# Patient Record
Sex: FEMALE | Race: OTHER | NOT HISPANIC OR LATINO | Employment: OTHER | URBAN - METROPOLITAN AREA
[De-identification: names, ages, dates, MRNs, and addresses within clinical notes are randomized per-mention and may not be internally consistent; named-entity substitution may affect disease eponyms.]

---

## 2024-04-30 ENCOUNTER — DOCUMENTATION (OUTPATIENT)
Dept: HEMATOLOGY ONCOLOGY | Facility: CLINIC | Age: 54
End: 2024-04-30

## 2024-04-30 ENCOUNTER — OFFICE VISIT (OUTPATIENT)
Dept: FAMILY MEDICINE CLINIC | Facility: CLINIC | Age: 54
End: 2024-04-30
Payer: COMMERCIAL

## 2024-04-30 ENCOUNTER — TELEPHONE (OUTPATIENT)
Dept: HEMATOLOGY ONCOLOGY | Facility: CLINIC | Age: 54
End: 2024-04-30

## 2024-04-30 VITALS
BODY MASS INDEX: 18.8 KG/M2 | DIASTOLIC BLOOD PRESSURE: 62 MMHG | TEMPERATURE: 97.7 F | WEIGHT: 138.8 LBS | RESPIRATION RATE: 16 BRPM | OXYGEN SATURATION: 98 % | HEIGHT: 72 IN | SYSTOLIC BLOOD PRESSURE: 88 MMHG | HEART RATE: 68 BPM

## 2024-04-30 DIAGNOSIS — Z15.01 BRCA1 GENE MUTATION POSITIVE: ICD-10-CM

## 2024-04-30 DIAGNOSIS — Z98.890 HX OF BREAST RECONSTRUCTION: ICD-10-CM

## 2024-04-30 DIAGNOSIS — L03.112 CELLULITIS OF LEFT AXILLA: ICD-10-CM

## 2024-04-30 DIAGNOSIS — F32.A ANXIETY AND DEPRESSION: Primary | ICD-10-CM

## 2024-04-30 DIAGNOSIS — F41.9 ANXIETY AND DEPRESSION: Primary | ICD-10-CM

## 2024-04-30 DIAGNOSIS — Z15.09 BRCA1 GENE MUTATION POSITIVE: ICD-10-CM

## 2024-04-30 DIAGNOSIS — N95.1 MENOPAUSAL SYMPTOMS: ICD-10-CM

## 2024-04-30 PROBLEM — F51.01 PRIMARY INSOMNIA: Status: ACTIVE | Noted: 2024-04-30

## 2024-04-30 PROBLEM — L03.90 CELLULITIS: Status: ACTIVE | Noted: 2024-04-30

## 2024-04-30 PROCEDURE — 99214 OFFICE O/P EST MOD 30 MIN: CPT | Performed by: FAMILY MEDICINE

## 2024-04-30 PROCEDURE — 3725F SCREEN DEPRESSION PERFORMED: CPT | Performed by: FAMILY MEDICINE

## 2024-04-30 RX ORDER — AMOXICILLIN AND CLAVULANATE POTASSIUM 500; 125 MG/1; MG/1
1 TABLET, FILM COATED ORAL EVERY 12 HOURS
COMMUNITY
Start: 2024-04-27 | End: 2024-05-08 | Stop reason: ALTCHOICE

## 2024-04-30 RX ORDER — ALPRAZOLAM 0.5 MG/1
0.5 TABLET ORAL
COMMUNITY

## 2024-04-30 RX ORDER — FLUOXETINE HYDROCHLORIDE 20 MG/1
20 CAPSULE ORAL DAILY
COMMUNITY

## 2024-04-30 RX ORDER — DOXYCYCLINE HYCLATE 100 MG/1
100 CAPSULE ORAL EVERY 12 HOURS
COMMUNITY
Start: 2024-04-27 | End: 2024-05-08 | Stop reason: ALTCHOICE

## 2024-04-30 NOTE — ASSESSMENT & PLAN NOTE
- Patient establishing care today.  Recently moved from [place].  -Notes she is following with Psychiatry in [place]  - Using Prozac 20 mg for many years.  -Anxiety and depression, stable.  Denies any SI or HI.  -Also uses Delorazepam as needed at bedtime for racing thoughts and increased anxiety which also seems to help.  -Follow-up in 6 months or sooner as needed.

## 2024-04-30 NOTE — PROGRESS NOTES
Intake received- chart reviewed for external records retrieval.Patient is scheduled on 5-7-2024 with Kristin Walker for Dx: BRCA 1 mutation positive, Cellulitis of left axilla .Due to Dx on patient's referral, no records retrieval needed by Oncology Care Coordination.

## 2024-04-30 NOTE — TELEPHONE ENCOUNTER
Anne called in response to a referral that was received for patient to establish care with Surgical Oncology.     Outreach was made to schedule a consultation.    A consultation was scheduled for patient during this call. Patient is scheduled on 5/7/24 at 2:30 with Kristin Walker at the [facility]

## 2024-04-30 NOTE — PROGRESS NOTES
Name: Anne Calderón      : 1970      MRN: [id number]  Encounter Provider: Adam Khan DO  Encounter Date: 2024   Encounter department: [facility]    Assessment & Plan     1. Anxiety and depression  Assessment & Plan:  - Patient establishing care today.  Recently moved from [place].  -Notes she is following with Psychiatry in [place]  - Using Prozac 20 mg for many years.  -Anxiety and depression, stable.  Denies any SI or HI.  -Also uses Delorazepam as needed at bedtime for racing thoughts and increased anxiety which also seems to help.  -Follow-up in 6 months or sooner as needed.      2. Cellulitis of left axilla  Assessment & Plan:  - Has concern for area of erythema on underside of left breast and axilla.  Seen in urgent care and started on antibiotics for possible cellulitis.  -Area of erythema does not seem to be increasing.  However after a few days of antibiotics unclear if much improved.  -Denies fevers, chills, nausea, vomiting.  Denies any unwanted weight loss or night sweats.  Denies any trauma to the area.  -Will refer to breast surgeon for evaluation.  -Continue antibiotics to completion.    Orders:  -     Ambulatory Referral to Breast Surgery; Future  -     mupirocin (BACTROBAN) 2 % ointment; Apply topically 3 (three) times a day    3. BRCA1 gene mutation positive  Assessment & Plan:  - Tested for procal and a mutation few years ago in [place] and found positive.  -Had prophylactic bilateral mastectomy and reconstructive surgery.      Orders:  -     Ambulatory Referral to Breast Surgery; Future    4. Menopausal symptoms  Assessment & Plan:  - Establishing care today. Has been using Tibolone prescribed in [place] for hot flashes, vaginal dryness and feels it is the only medication that has worked for her.   -Referral placed for recommendations.    Orders:  -     Ambulatory Referral to Obstetrics / Gynecology; Future    5. Hx of breast reconstruction  -     Ambulatory  Referral to Breast Surgery; Future           Subjective     Anne is a 54-year-old female who presents today to establish care.  She notes she recently moved from [place].  Admits to a known colonic however 3 to 4 years due to her 's work.  She presents today with concerns about diagnosed cellulitis under her left breast.  Patient has significant past medical history of BRCA1 mutation for which she had prophylactic bilateral mastectomy and reconstructive surgery.  This was done in [place].  She has relationship with her breast surgeon and was seeking advice over text message however they recommended her being seen.  Was seen in urgent care and started on antibiotics for possible infectious etiology.  Patient has been taking antibiotics for couple days with no worsening of erythema however unclear if it is improved much.  She denies any fevers, chills, nausea, vomiting, streaking of the redness.  Denies any discharge from the area.  Is mildly tender.  Denies any trauma.  She also notes has a past medical history of anxiety and depression which she was following with psychiatrist.  She is taking Prozac and has been on it for quite some time and feels well-controlled.  Denies any SI or HI here today.  Also notes she has trouble with sleeping due to her anxiety medication and has been prescribed a benzodiazepine-delorazepam she is on occasion and is well-controlled.  Is getting at least 7 to 8 hours of sleep at night.  She is otherwise eating healthy and exercising most days.  No other concerns today.      Review of Systems   Constitutional:  Negative for chills and fever.   HENT:  Negative for congestion, ear pain, postnasal drip, rhinorrhea, sinus pressure and sore throat.    Eyes:  Negative for pain and visual disturbance.   Respiratory:  Negative for cough and shortness of breath.    Cardiovascular:  Negative for chest pain and palpitations.   Gastrointestinal:  Negative for abdominal pain and vomiting.    Endocrine: Negative for polydipsia and polyuria.   Genitourinary:  Negative for dysuria and hematuria.   Musculoskeletal:  Negative for arthralgias and back pain.   Skin:  Positive for color change. Negative for rash.        Area of redness under left breast.   Neurological:  Negative for dizziness, seizures, syncope and headaches.   Psychiatric/Behavioral:  Negative for confusion, sleep disturbance and suicidal ideas. The patient is nervous/anxious.    All other systems reviewed and are negative.      Past Medical History:   Diagnosis Date    Anxiety     Arthritis     Pneumonia     Visual impairment      Past Surgical History:   Procedure Laterality Date    APPENDECTOMY      KNEE SURGERY       Family History   Problem Relation Age of Onset    Breast cancer Mother     Cancer Father     Arthritis Sister     Breast cancer Sister     Arthritis Brother      Social History     Socioeconomic History    Marital status: /Civil Union     Spouse name: None    Number of children: None    Years of education: None    Highest education level: None   Occupational History    None   Tobacco Use    Smoking status: Never    Smokeless tobacco: Never    Tobacco comments:     Sometimes Sigars or Pipe but very seldom   Vaping Use    Vaping status: Never Used   Substance and Sexual Activity    Alcohol use: Never    Drug use: Never    Sexual activity: Yes     Partners: Male     Birth control/protection: Post-menopausal     Comment: Preventive Ovarectomy   Other Topics Concern    None   Social History Narrative    None     Social Determinants of Health     Financial Resource Strain: Not on file   Food Insecurity: Not on file   Transportation Needs: Not on file   Physical Activity: Not on file   Stress: Not on file   Social Connections: Not on file   Intimate Partner Violence: Not on file   Housing Stability: Not on file     Current Outpatient Medications on File Prior to Visit   Medication Sig    ALPRAZolam (XANAX) 0.5 mg tablet  "Take 0.5 mg by mouth daily at bedtime as needed for anxiety    amoxicillin-clavulanate (AUGMENTIN) 500-125 mg per tablet Take 1 tablet by mouth every 12 (twelve) hours    doxycycline hyclate (VIBRAMYCIN) 100 mg capsule Take 100 mg by mouth every 12 (twelve) hours    FLUoxetine (PROzac) 20 mg capsule Take 20 mg by mouth daily     No Known Allergies    There is no immunization history on file for this patient.    Objective     BP (!) 88/62 (BP Location: Left arm, Patient Position: Sitting, Cuff Size: Standard)   Pulse 68   Temp 97.7 °F (36.5 °C) (Tympanic)   Resp 16   Ht 5' 11.61\" (1.819 m)   Wt 63 kg (138 lb 12.8 oz)   SpO2 98%   BMI 19.03 kg/m²     Physical Exam  Vitals and nursing note reviewed.   Constitutional:       Appearance: Normal appearance.   HENT:      Head: Normocephalic and atraumatic.      Right Ear: Tympanic membrane and external ear normal.      Left Ear: Tympanic membrane and external ear normal.      Nose: Nose normal.      Mouth/Throat:      Mouth: Mucous membranes are moist.   Eyes:      Extraocular Movements: Extraocular movements intact.      Conjunctiva/sclera: Conjunctivae normal.      Pupils: Pupils are equal, round, and reactive to light.   Cardiovascular:      Rate and Rhythm: Normal rate and regular rhythm.      Pulses: Normal pulses.      Heart sounds: Normal heart sounds.   Pulmonary:      Effort: Pulmonary effort is normal.      Breath sounds: Normal breath sounds.   Abdominal:      General: Bowel sounds are normal.      Palpations: Abdomen is soft.   Musculoskeletal:         General: Normal range of motion.      Cervical back: Normal range of motion.   Lymphadenopathy:      Cervical: No cervical adenopathy.   Skin:     General: Skin is warm.      Capillary Refill: Capillary refill takes less than 2 seconds.      Findings: Erythema present.      Comments: 2 to 3 cm area of erythema, no fluctuance noted.  Mildly tender to palpation and warm.  No streaking.  No noted axillary " lymphadenopathy.   Neurological:      General: No focal deficit present.      Mental Status: She is alert and oriented to person, place, and time.   Psychiatric:         Mood and Affect: Mood normal.         Behavior: Behavior normal.       Adam Khan, DO

## 2024-04-30 NOTE — ASSESSMENT & PLAN NOTE
- Tested for procal and a mutation few years ago in [place] and found positive.  -Had prophylactic bilateral mastectomy and reconstructive surgery.

## 2024-05-02 ENCOUNTER — TELEPHONE (OUTPATIENT)
Dept: SURGICAL ONCOLOGY | Facility: CLINIC | Age: 54
End: 2024-05-02

## 2024-05-02 PROBLEM — N95.1 MENOPAUSAL SYMPTOMS: Status: ACTIVE | Noted: 2024-05-02

## 2024-05-02 NOTE — ASSESSMENT & PLAN NOTE
- Establishing care today. Has been using Tibolone prescribed in [place] for hot flashes, vaginal dryness and feels it is the only medication that has worked for her.   -Referral placed for recommendations.

## 2024-05-02 NOTE — ASSESSMENT & PLAN NOTE
- Has concern for area of erythema on underside of left breast and axilla.  Seen in urgent care and started on antibiotics for possible cellulitis.  -Area of erythema does not seem to be increasing.  However after a few days of antibiotics unclear if much improved.  -Denies fevers, chills, nausea, vomiting.  Denies any unwanted weight loss or night sweats.  Denies any trauma to the area.  -Will refer to breast surgeon for evaluation.  -Continue antibiotics to completion.

## 2024-05-02 NOTE — TELEPHONE ENCOUNTER
Called patient 4/30/24 and left voice message. Called and spoke to patient 5/2/24. Patient was seen at [facility] in [place] and had mastectomy done 2006. Patient had no mammograms done. Faxed request, having records requested to Right Fax [phone].

## 2024-05-07 ENCOUNTER — CONSULT (OUTPATIENT)
Dept: SURGICAL ONCOLOGY | Facility: CLINIC | Age: 54
End: 2024-05-07

## 2024-05-07 ENCOUNTER — APPOINTMENT (OUTPATIENT)
Dept: LAB | Facility: CLINIC | Age: 54
End: 2024-05-07

## 2024-05-07 VITALS
HEART RATE: 52 BPM | HEIGHT: 72 IN | WEIGHT: 141.2 LBS | BODY MASS INDEX: 19.13 KG/M2 | TEMPERATURE: 99.1 F | DIASTOLIC BLOOD PRESSURE: 60 MMHG | SYSTOLIC BLOOD PRESSURE: 120 MMHG | OXYGEN SATURATION: 100 %

## 2024-05-07 DIAGNOSIS — N61.1 LEFT BREAST ABSCESS: ICD-10-CM

## 2024-05-07 DIAGNOSIS — N61.1 LEFT BREAST ABSCESS: Primary | ICD-10-CM

## 2024-05-07 PROBLEM — Z90.13 S/P MASTECTOMY, BILATERAL: Status: ACTIVE | Noted: 2024-05-07

## 2024-05-07 LAB
BUN SERPL-MCNC: 15 MG/DL (ref 5–25)
CREAT SERPL-MCNC: 0.79 MG/DL (ref 0.6–1.3)
GFR SERPL CREATININE-BSD FRML MDRD: 85 ML/MIN/1.73SQ M

## 2024-05-07 PROCEDURE — 82565 ASSAY OF CREATININE: CPT

## 2024-05-07 PROCEDURE — 36415 COLL VENOUS BLD VENIPUNCTURE: CPT

## 2024-05-07 PROCEDURE — 84520 ASSAY OF UREA NITROGEN: CPT

## 2024-05-07 PROCEDURE — 99204 OFFICE O/P NEW MOD 45 MIN: CPT

## 2024-05-07 NOTE — PROGRESS NOTES
Surgical Oncology Consult       [facility] [place]  [address] [facility] [address]  [phone]    Anne Calderón  1970  [id number]  [facility] [place]  [address] [facility] [address]  [phone]    Diagnoses and all orders for this visit:    Left breast abscess  -     MRI breast bilateral w and wo contrast w cad; Future  -     BUN; Future  -     Creatinine, serum; Future        Chief Complaint   Patient presents with    Consult       Return for Imaging - See orders.        History of Present Illness:  The patient presents to the office today with concerns of left breast infection.  She is s/p bilateral prophylactic mastectomy with reconstruction due to +BRCA1 gene, and had undergone implant revision 2 months ago.  She has also undergone prophylactic oophorectomy, and all surgeries have been performed in [place].  Two weeks ago she states she moved her arms a certain way and felt something tear in the left breast, after which she started to notice redness developing.  She has only been in the [place] for 6 weeks, and had not established care, so she went to an Urgent Care center for treatment.  She was given a rocephin injection and was placed on doxycycline and augmentin, which she completed today.  She reports she still has redness and drainage from the outer breast.  She denies any other breast lumps or concerns.  She had been experiencing fevers and chills, but is not at this time.      Review of Systems  Complete ROS Surg Onc:   Constitutional: The patient denies new or recent history of general fatigue, no recent weight loss, no change in appetite.   Eyes: No complaints of visual problems, no scleral icterus.   ENT: no complaints of ear pain, no hoarseness, no difficulty swallowing,  no tinnitus and no new masses in head, oral cavity, or neck.    Cardiovascular: No complaints of chest pain, no palpitations, no ankle edema.   Respiratory: No complaints of shortness of breath, no cough.   Gastrointestinal: No complaints of jaundice, no bloody stools, no pale stools.   Genitourinary: No complaints of dysuria, no hematuria, no nocturia, no frequent urination, no urethral discharge.   Musculoskeletal: No complaints of weakness, paralysis, joint stiffness or arthralgias.  Integumentary: No complaints of rash, no new lesions. + left breast wound with redness and drainage  Neurological: No complaints of convulsions, no seizures, no dizziness.   Hematologic/Lymphatic: No complaints of easy bruising.   Endocrine:  No hot or cold intolerance.  No polydipsia, polyphagia, or polyuria.  Allergy/immunology:  No environmental allergies.  No food allergies.  Not immunocompromised.            Patient Active Problem List   Diagnosis    Cellulitis    Anxiety and depression    BRCA1 gene mutation positive    Primary insomnia    Menopausal symptoms    S/P mastectomy, bilateral     Past Medical History:   Diagnosis Date    Anxiety     Arthritis     Pneumonia     Visual impairment      Past Surgical History:   Procedure Laterality Date    APPENDECTOMY      BILATERAL OOPHORECTOMY      prophylactic    BREAST RECONSTRUCTION Bilateral 2006    in [place]    KNEE SURGERY      MASTECTOMY Bilateral 2006    prophylactic - in [place]    REVISION RECONSTRUCTED BREAST Left 03/2024    in [place]     Family History   Problem Relation Age of Onset    Breast cancer Mother     Cancer Father     Arthritis Sister     Breast cancer Sister     Arthritis Brother      Social History     Socioeconomic History    Marital status: /Civil Union     Spouse name: Not on file    Number of children: Not on file    Years of education: Not on file    Highest education level: Not on file   Occupational History    Not on file   Tobacco Use    Smoking status: Never    Smokeless tobacco: Never    Tobacco comments:      Sometimes Sigars or Pipe but very seldom   Vaping Use    Vaping status: Never Used   Substance and Sexual Activity    Alcohol use: Never    Drug use: Never    Sexual activity: Yes     Partners: Male     Birth control/protection: Post-menopausal     Comment: Preventive Ovarectomy   Other Topics Concern    Not on file   Social History Narrative    Not on file     Social Determinants of Health     Financial Resource Strain: Not on file   Food Insecurity: Not on file   Transportation Needs: Not on file   Physical Activity: Not on file   Stress: Not on file   Social Connections: Not on file   Intimate Partner Violence: Not on file   Housing Stability: Not on file       Current Outpatient Medications:     ALPRAZolam (XANAX) 0.5 mg tablet, Take 0.5 mg by mouth daily at bedtime as needed for anxiety, Disp: , Rfl:     amoxicillin-clavulanate (AUGMENTIN) 500-125 mg per tablet, Take 1 tablet by mouth every 12 (twelve) hours, Disp: , Rfl:     doxycycline hyclate (VIBRAMYCIN) 100 mg capsule, Take 100 mg by mouth every 12 (twelve) hours, Disp: , Rfl:     FLUoxetine (PROzac) 20 mg capsule, Take 20 mg by mouth daily, Disp: , Rfl:     mupirocin (BACTROBAN) 2 % ointment, Apply topically 3 (three) times a day, Disp: 100 g, Rfl: 0  No Known Allergies  Vitals:    05/07/24 1441   BP: 120/60   Pulse: (!) 52   Temp: 99.1 °F (37.3 °C)   SpO2: 100%       Physical Exam  Vitals reviewed.   Constitutional:       General: She is not in acute distress.     Appearance: Normal appearance. She is normal weight. She is not ill-appearing or toxic-appearing.   HENT:      Head: Normocephalic and atraumatic.      Nose: Nose normal.      Mouth/Throat:      Mouth: Mucous membranes are moist.   Eyes:      General: No scleral icterus.     Conjunctiva/sclera: Conjunctivae normal.   Cardiovascular:      Rate and Rhythm: Normal rate.   Pulmonary:      Effort: Pulmonary effort is normal.   Chest:          Comments: Reconstructed breasts are generally smooth  and even, without any underlying nodules or masses.  Erythema is present on left outer breast with 3 pinhole openings in the skin along the scarline.  There is thick drainage present.  Musculoskeletal:         General: Normal range of motion.      Cervical back: Normal range of motion and neck supple.   Skin:     General: Skin is warm.      Findings: Erythema present.   Neurological:      General: No focal deficit present.      Mental Status: She is alert and oriented to person, place, and time.   Psychiatric:         Mood and Affect: Mood normal.         Behavior: Behavior normal.         Thought Content: Thought content normal.         Judgment: Judgment normal.            Discussion/Summary: This is a 53 y/o female who presents today for left breast abscess.  She has bilateral breast implants in place s/p prophylactic mastectomy.  Based on physical exam, I am concerned that there may be an abscess collection behind the implant in the left breast.  I have reviewed her case with Dr Gonzalez, and will send the patient for an emergent breast MRI.  I will call the patient once I have results.  If it appears that there is in fact a collection, I will refer her to Plastic Surgery for immediate evaluation.  I will hold off on starting her on any more antibiotics at this time.  I have reviewed that she should continue to see me once yearly for a breast exam given her BRCA mutation.  She is agreeable to the plan, all questions were answered today.

## 2024-05-08 ENCOUNTER — TELEPHONE (OUTPATIENT)
Dept: SURGICAL ONCOLOGY | Facility: CLINIC | Age: 54
End: 2024-05-08

## 2024-05-08 DIAGNOSIS — N61.1 LEFT BREAST ABSCESS: Primary | ICD-10-CM

## 2024-05-08 RX ORDER — SULFAMETHOXAZOLE AND TRIMETHOPRIM 800; 160 MG/1; MG/1
1 TABLET ORAL EVERY 12 HOURS SCHEDULED
Qty: 20 TABLET | Refills: 0 | Status: SHIPPED | OUTPATIENT
Start: 2024-05-08 | End: 2024-05-18

## 2024-05-08 NOTE — TELEPHONE ENCOUNTER
STAT breast MRI was scheduled for Monday 5/13 due to facility availability.  I contacted head of radiology and attempted to have this performed sooner due to a possible abscess formation.  Offered MRI appts on 5/9 and 5/10, but patient declined, states she is leaving for [place] today. Will keep MRI on for 5/13.  Messaged patient that I will be sending Bactrim in to her pharmacy today. She should pick this up before leaving for [place] and start taking today.

## 2024-05-13 ENCOUNTER — HOSPITAL ENCOUNTER (OUTPATIENT)
Dept: RADIOLOGY | Facility: HOSPITAL | Age: 54
Discharge: HOME | End: 2024-05-13
Payer: COMMERCIAL

## 2024-05-13 DIAGNOSIS — N61.1 LEFT BREAST ABSCESS: ICD-10-CM

## 2024-05-13 PROCEDURE — A9585 GADOBUTROL INJECTION: HCPCS

## 2024-05-13 PROCEDURE — C8908 MRI W/O FOL W/CONT, BREAST,: HCPCS

## 2024-05-13 PROCEDURE — C8937 CAD BREAST MRI: HCPCS

## 2024-05-13 RX ORDER — GADOBUTROL 604.72 MG/ML
6 INJECTION INTRAVENOUS
Status: COMPLETED | OUTPATIENT
Start: 2024-05-13 | End: 2024-05-13

## 2024-05-13 RX ADMIN — GADOBUTROL 6 ML: 604.72 INJECTION INTRAVENOUS at 19:26

## 2024-05-14 ENCOUNTER — OFFICE VISIT (OUTPATIENT)
Dept: SURGICAL ONCOLOGY | Facility: CLINIC | Age: 54
End: 2024-05-14
Payer: COMMERCIAL

## 2024-05-14 VITALS
WEIGHT: 139.6 LBS | HEART RATE: 52 BPM | BODY MASS INDEX: 19.54 KG/M2 | OXYGEN SATURATION: 93 % | HEIGHT: 71 IN | SYSTOLIC BLOOD PRESSURE: 108 MMHG | DIASTOLIC BLOOD PRESSURE: 60 MMHG | TEMPERATURE: 98.7 F

## 2024-05-14 DIAGNOSIS — S21.002S BREAST WOUND, LEFT, SEQUELA: ICD-10-CM

## 2024-05-14 DIAGNOSIS — N61.1 LEFT BREAST ABSCESS: Primary | ICD-10-CM

## 2024-05-14 DIAGNOSIS — Z98.82 BREAST IMPLANT STATUS: ICD-10-CM

## 2024-05-14 PROCEDURE — 99214 OFFICE O/P EST MOD 30 MIN: CPT

## 2024-05-14 NOTE — PROGRESS NOTES
Surgical Oncology Follow Up       [facility]  [address] [facility] [address]  [phone]    Anne Calderón  1970  [id number]  [facility]  [address] [facility] [address]  [phone]    Diagnoses and all orders for this visit:    Left breast abscess  -     Ambulatory referral to Plastic Surgery; Future    Breast wound, left, sequela  -     Ambulatory referral to Plastic Surgery; Future    Breast implant status  -     Ambulatory referral to Plastic Surgery; Future        Chief Complaint   Patient presents with    Follow-up       Return if symptoms worsen or fail to improve.      History of Present Illness: The patient returns to the office today in follow-up for left breast infection. She had MRI performed last night.  She reports the infection seems to be improving on the Bactrim, but she is still experiencing discharge.  She is scheduled to fly to [place] on Thursday and plans to see her Plastic Surgeon there on Friday, but will be returning to the  on Sunday.      Review of Systems  Complete ROS Surg Onc:   Complete ROS Surg Onc:   Constitutional: The patient denies new or recent history of general fatigue, no recent weight loss, no change in appetite.   Eyes: No complaints of visual problems, no scleral icterus.   ENT: no complaints of ear pain, no hoarseness, no difficulty swallowing,  no tinnitus and no new masses in head, oral cavity, or neck.   Cardiovascular: No complaints of chest pain, no palpitations, no ankle edema.   Respiratory: No complaints of shortness of breath, no cough.   Gastrointestinal: No complaints of jaundice, no bloody stools, no pale stools.   Genitourinary: No complaints of dysuria, no hematuria, no nocturia, no frequent urination, no urethral discharge.   Musculoskeletal: No complaints of weakness, paralysis,  joint stiffness or arthralgias.  Integumentary: No complaints of rash, no new lesions. + Left breast drainage, wound.  Neurological: No complaints of convulsions, no seizures, no dizziness.   Hematologic/Lymphatic: No complaints of easy bruising.   Endocrine:  No hot or cold intolerance.  No polydipsia, polyphagia, or polyuria.  Allergy/immunology:  No environmental allergies.  No food allergies.  Not immunocompromised.          Patient Active Problem List   Diagnosis    Cellulitis    Anxiety and depression    BRCA1 gene mutation positive    Primary insomnia    Menopausal symptoms    S/P mastectomy, bilateral     Past Medical History:   Diagnosis Date    Anxiety     Arthritis     Pneumonia     Visual impairment      Past Surgical History:   Procedure Laterality Date    APPENDECTOMY      BILATERAL OOPHORECTOMY      prophylactic    BREAST RECONSTRUCTION Bilateral 2006    in [place]    KNEE SURGERY      MASTECTOMY Bilateral 2006    prophylactic - in [place]    REVISION RECONSTRUCTED BREAST Left 03/2024    in [place]     Family History   Problem Relation Age of Onset    Breast cancer Mother     Cancer Father     Arthritis Sister     Breast cancer Sister     Arthritis Brother      Social History     Socioeconomic History    Marital status: /Civil Union     Spouse name: Not on file    Number of children: Not on file    Years of education: Not on file    Highest education level: Not on file   Occupational History    Not on file   Tobacco Use    Smoking status: Never    Smokeless tobacco: Never    Tobacco comments:     Sometimes Sigars or Pipe but very seldom   Vaping Use    Vaping status: Never Used   Substance and Sexual Activity    Alcohol use: Never    Drug use: Never    Sexual activity: Yes     Partners: Male     Birth control/protection: Post-menopausal     Comment: Preventive Ovarectomy   Other Topics Concern    Not on file   Social History Narrative    Not on file     Social Determinants of Health     Financial  Resource Strain: Not on file   Food Insecurity: Not on file   Transportation Needs: Not on file   Physical Activity: Not on file   Stress: Not on file   Social Connections: Not on file   Intimate Partner Violence: Not on file   Housing Stability: Not on file       Current Outpatient Medications:     ALPRAZolam (XANAX) 0.5 mg tablet, Take 0.5 mg by mouth daily at bedtime as needed for anxiety, Disp: , Rfl:     FLUoxetine (PROzac) 20 mg capsule, Take 20 mg by mouth daily, Disp: , Rfl:     mupirocin (BACTROBAN) 2 % ointment, Apply topically 3 (three) times a day, Disp: 100 g, Rfl: 0    sulfamethoxazole-trimethoprim (BACTRIM DS) 800-160 mg per tablet, Take 1 tablet by mouth every 12 (twelve) hours for 10 days, Disp: 20 tablet, Rfl: 0  No current facility-administered medications for this visit.  No Known Allergies  Vitals:    05/14/24 1127   BP: 108/60   Pulse: (!) 52   Temp: 98.7 °F (37.1 °C)   SpO2: 93%       Physical Exam  Vitals reviewed.   Constitutional:       General: She is not in acute distress.     Appearance: Normal appearance. She is normal weight. She is not ill-appearing or toxic-appearing.   HENT:      Head: Normocephalic and atraumatic.      Nose: Nose normal.      Mouth/Throat:      Mouth: Mucous membranes are moist.   Eyes:      General: No scleral icterus.     Conjunctiva/sclera: Conjunctivae normal.   Cardiovascular:      Rate and Rhythm: Normal rate.   Pulmonary:      Effort: Pulmonary effort is normal.   Chest:          Comments: Draining left outer breast wound approx 2cm in length with adipose tissue exposed.  Improving erythema.  Implant appears intact on exam.  Musculoskeletal:         General: Normal range of motion.      Cervical back: Normal range of motion and neck supple.   Skin:     General: Skin is warm.   Neurological:      General: No focal deficit present.      Mental Status: She is alert and oriented to person, place, and time.   Psychiatric:         Mood and Affect: Mood normal.          Behavior: Behavior normal.         Thought Content: Thought content normal.         Judgment: Judgment normal.           Imaging  MRI breast bilateral w and wo contrast w cad    Result Date: 5/14/2024  Narrative: DIAGNOSIS: Left breast abscess TECHNIQUE: MRI of both breasts was performed in axial planes utilizing a combination of localizer, axial T2 STIR, Axial T1 FSPGR in phase, axial dynamic 3D fat suppressed gradient-echo T1 sequences (VIBRANT) before and after contrast administration at multiple time points, and sagittal 3D fat suppressed gradient-echo T1 sequences (VIBRANT) post-contrast. This exam was read in conjunction with [employer] software. MEDICATIONS ADMINISTERED: Gadobutrol injection (SINGLE-DOSE) SOLN 6 mL, Total Given: 6 mL (1 dose) Intravenous contrast was administered at 2cc/sec, without documented contrast reaction. COMPARISONS: None available. RELEVANT HISTORY: Family Breast Cancer History: History of breast cancer in Mother, Sister. Family Medical History: Family medical history includes breast cancer in 2 relatives (mother, sister). Personal History: Hormone history includes other. Surgical history includes mastectomy and breast explant. No known relevant medical history. RISK ASSESSMENT: 5 Year Tyrer-Cuzick: 3.41% 10 Year Tyrer-Cuzick: 7.25% Lifetime Tyrer-Cuzick: 24.95%  TISSUE DENSITY: FGT: The breasts are almost entirely fatty. BPE: The background parenchymal enhancement is minimal and symmetric. INDICATION: Anne Calderón is a 54 y.o. female presenting for left breast abscess.  Prophylactic bilateral mastectomy and implant reconstruction performed in [place] in 2006.  BRCA1 gene mutation. Patient underwent implant revision 2 months ago.  A few weeks ago she moved her arm a certain way and felt something tear in her breast and then started to notice redness developing.  She was given a Rocephin injection and placed on doxycycline and Augmentin which she completed 5/7/2024.  She reports  redness and drainage from the outer left breast.  She had fevers and chills which have resolved.  FINDINGS: BILATERAL 1) POST-SURGICAL FINDING [A]: There are post-surgical findings of bilateral mastectomy with retropectoral implant reconstruction.   Left breast: At the lower posterior margin of the implant on the left breast there is T2 hyperintensity with non-mass enhancement which measures 6 mm in thickness on axial slices.  There is thin enhancement extending along the posterior margin of the implant throughout much of the breast.  There is no discrete fluid collection.  In the lower outer breast, the enhancement extends to the lateral skin surface, likely corresponding with the areas of drainage reported in the surgical consultation note. Right breast: No MRI evidence of invasive breast malignancy. There are no abnormally enlarged axillary or internal mammary lymph nodes.     Impression: 1.  There is some T2 hyperintensity with abnormal enhancement posterior to the left implant and extending to the lower outer skin.  This corresponds with the described area of suspected infection.  There is thin enhancement extending throughout most of the posterior capsule without a discrete fluid collection.  If this does not resolve with antibiotic treatment, ultrasound could be performed to evaluate for feasibility of biopsy.p 2.  No MRI evidence of malignancy in the right breast. ASSESSMENT/BI-RADS CATEGORY: Left: 2 - Benign Right: 2 - Benign Overall: 2 - Benign RECOMMENDATION:      - Breast MRI Screening in 1 year for both breasts (if appropriate based on clinical guidelines).      - Clinical management for the left breast. Workstation ID: [id number]    I personally reviewed and interpreted the above imaging data.    Discussion/Summary: This is a 55 y/o female who presents today for left breast infection management. MRI findings are concerning for infection present behind the implant.  I have explained that it is  possible that the implant is the source of the infection, or this could just be a local infection that happens to be in the vicinity of the implant.  The pinhole openings in the skin have now graduated to a 2cm wound with underlying tissue exposed, and she will need to see a plastic surgeon emergently to discuss repair/treatment.  I have provided her with a referral today, but she would prefer to wait until after she is seen by her surgeon in [place] later this week.  Advised pt to keep wound clean and covered, and she will continue to apply antibiotic ointment to the area.  Advised her to take Bactrim to completion.  All questions were answered today. I can see her as needed.

## 2024-05-17 ENCOUNTER — TELEPHONE (OUTPATIENT)
Dept: PLASTIC SURGERY | Facility: CLINIC | Age: 54
End: 2024-05-17

## 2024-05-17 NOTE — TELEPHONE ENCOUNTER
Patient emailed me to say she went to [place] and her surgeon took care of her wound issue.  Patient is staying in [place] for some time and cancelling her upcoming appointment at our office as it is not needed.

## 2024-06-17 DIAGNOSIS — F32.A ANXIETY AND DEPRESSION: Primary | ICD-10-CM

## 2024-06-17 DIAGNOSIS — F41.9 ANXIETY AND DEPRESSION: Primary | ICD-10-CM

## 2024-06-17 RX ORDER — FLUOXETINE HYDROCHLORIDE 20 MG/1
20 CAPSULE ORAL DAILY
Qty: 90 CAPSULE | Refills: 1 | Status: SHIPPED | OUTPATIENT
Start: 2024-06-17

## 2024-06-17 RX ORDER — HYDROXYZINE HYDROCHLORIDE 25 MG/1
25 TABLET, FILM COATED ORAL EVERY 6 HOURS PRN
Qty: 90 TABLET | Refills: 1 | Status: SHIPPED | OUTPATIENT
Start: 2024-06-17

## 2024-07-05 ENCOUNTER — TELEPHONE (OUTPATIENT)
Age: 54
End: 2024-07-05

## 2024-07-05 ENCOUNTER — TELEPHONE (OUTPATIENT)
Dept: PLASTIC SURGERY | Facility: CLINIC | Age: 54
End: 2024-07-05

## 2024-07-05 NOTE — TELEPHONE ENCOUNTER
Pt called to reschedule her canceled appt from 5/22.  in [place] says she may have infection.    Please call pt back for rescheduling

## 2024-07-05 NOTE — TELEPHONE ENCOUNTER
Patient had emailed that she is having wound issues after having been in [place] and having suture removal.  Had previously been set up to see Dr. Maharaj but patient had returned to [place] and felt wound issue was resolved.  She is in [facility] and will be going to the local hospital and would like to follow with us.  Tried to call patient and phone just rang and rang, unable to leave voicemail.  Emailed patient to call to set up consultation and advised I will be out of office and return on Monday.

## 2024-07-08 NOTE — TELEPHONE ENCOUNTER
Patient called asking for an apt due to a breast wound and abscess.    She had a surgery in [place] and went back to see her doctor there. Patient in a lot of pain and has an ASAP referral in.    Teams Communication to the front office to see if I can schedule patient in available spot on the schedule for today at 9:30 with Dr Graff//was advised I can't schedule and to schedule with any other provider at their next available.    Patient scheduled for 7/10 patient stated all done by her insurance and was medically needed but she's still aware of the $150 consultation fee.

## 2024-07-08 NOTE — TELEPHONE ENCOUNTER
Received a teams communication advising that this should go to Amanda to schedule.    Please advise if I should cancel patient's 7/10 apt and if you should call patient and assist with scheduling.    Thank you

## 2024-07-12 ENCOUNTER — TELEPHONE (OUTPATIENT)
Age: 54
End: 2024-07-12

## 2024-07-12 ENCOUNTER — PREP FOR PROCEDURE (OUTPATIENT)
Dept: PLASTIC SURGERY | Facility: CLINIC | Age: 54
End: 2024-07-12

## 2024-07-12 ENCOUNTER — OFFICE VISIT (OUTPATIENT)
Dept: PLASTIC SURGERY | Facility: HOSPITAL | Age: 54
End: 2024-07-12
Payer: COMMERCIAL

## 2024-07-12 VITALS
HEIGHT: 71 IN | BODY MASS INDEX: 19.46 KG/M2 | TEMPERATURE: 98.6 F | SYSTOLIC BLOOD PRESSURE: 108 MMHG | WEIGHT: 139 LBS | DIASTOLIC BLOOD PRESSURE: 64 MMHG | HEART RATE: 58 BPM

## 2024-07-12 DIAGNOSIS — Z42.1 AFTERCARE POSTMASTECTOMY FOR BREAST RECONSTRUCTION: Primary | ICD-10-CM

## 2024-07-12 DIAGNOSIS — T85.79XD INFECTION OF BREAST IMPLANT, SUBSEQUENT ENCOUNTER: Primary | ICD-10-CM

## 2024-07-12 DIAGNOSIS — Z15.01 BRCA1 POSITIVE: ICD-10-CM

## 2024-07-12 DIAGNOSIS — T85.79XA: ICD-10-CM

## 2024-07-12 DIAGNOSIS — Z15.09 BRCA1 POSITIVE: ICD-10-CM

## 2024-07-12 PROCEDURE — 99204 OFFICE O/P NEW MOD 45 MIN: CPT | Performed by: SURGERY

## 2024-07-12 NOTE — TELEPHONE ENCOUNTER
Patient called stating she had just missed a call from office, no documentation., please advise who called her?! Thank you.

## 2024-07-12 NOTE — H&P (VIEW-ONLY)
Assessment/Plan: Please see HPI.  Given the history related to wounds/drainage of the reconstructed left breast, without resolution despite multiple revisions and the biotic regimens, I suggested removal of the left breast implant, capsulectomy, and drain placement with a goal of treating/alleviating the periprosthetic infection.  Ultimately reconstruction will be considered, timing will depend on her response to the anticipated surgical intervention.  We discussed the procedure in detail, how would be performed, as well as potential risk, complications and limitations, anticipated results, recovery, use of a drain, also discussed the likelihood of needing/benefiting from tissue expansion in the future, she understands, her questions have been answered to her satisfaction, and consent has been obtained.  She will work with our coordinator to arrange a date for the procedure.     There are no diagnoses linked to this encounter.      Subjective: Breast Reconstruction/periprosthetic infection     Patient ID: Anne Calderón is a 54 y.o. female.    HPI Anne is a 54-year-old female, originally from [place], briefly, she had undergone bilateral breast reconstruction (in [place]) has developed a left periprosthetic abscess/infection.  This bite multiple interventions including oral antibiotic therapy, and wound revisions she continues with problematic wound to the left breast/inframammary fold, continued drainage and is here for an opinion regarding treatment options.  Much of her treatment has been performed in [place], reports that she had also recently been seen by a plastic surgeon on [facility], and the recommendation has been to undergo removal of the implant, capsulectomy and deferred/delayed left breast reconstruction.  She reviewed, on her phone, serial photographs of the infected left breast with me, she has left us with 2 of the chart work from her prior procedures in [place] (in [place]).    Review of Systems    Constitutional:  Negative for chills and fever.   HENT:  Negative for hearing loss.    Eyes:  Positive for visual disturbance. Negative for discharge.   Respiratory:  Negative for chest tightness and shortness of breath.    Cardiovascular:  Negative for chest pain and leg swelling.   Gastrointestinal:  Negative for constipation, diarrhea and nausea.   Genitourinary:  Negative for dysuria.   Musculoskeletal:  Negative for gait problem.   Skin:  Positive for wound. Negative for rash.        Open wound left breast, lateral inframammary fold, with cloudy serosanguineous drainage, see photo in media   Allergic/Immunologic: Negative for immunocompromised state.   Neurological:  Negative for seizures and headaches.   Hematological:  Does not bruise/bleed easily.   Psychiatric/Behavioral:  Negative for dysphoric mood. The patient is not nervous/anxious.          Objective:     Physical Exam  HENT:      Head: Normocephalic and atraumatic.   Eyes:      Extraocular Movements: Extraocular movements intact.      Pupils: Pupils are equal, round, and reactive to light.   Cardiovascular:      Rate and Rhythm: Normal rate.   Pulmonary:      Effort: Pulmonary effort is normal.   Abdominal:      Palpations: Abdomen is soft.   Musculoskeletal:         General: Normal range of motion.      Cervical back: Normal range of motion and neck supple.   Skin:     General: Skin is warm.          Neurological:      Mental Status: She is alert and oriented to person, place, and time.   Psychiatric:         Mood and Affect: Mood normal.

## 2024-07-12 NOTE — PROGRESS NOTES
Assessment/Plan: Please see HPI.  Given the history related to wounds/drainage of the reconstructed left breast, without resolution despite multiple revisions and the biotic regimens, I suggested removal of the left breast implant, capsulectomy, and drain placement with a goal of treating/alleviating the periprosthetic infection.  Ultimately reconstruction will be considered, timing will depend on her response to the anticipated surgical intervention.  We discussed the procedure in detail, how would be performed, as well as potential risk, complications and limitations, anticipated results, recovery, use of a drain, also discussed the likelihood of needing/benefiting from tissue expansion in the future, she understands, her questions have been answered to her satisfaction, and consent has been obtained.  She will work with our coordinator to arrange a date for the procedure.     There are no diagnoses linked to this encounter.      Subjective: Breast Reconstruction/periprosthetic infection     Patient ID: Anne Calderón is a 54 y.o. female.    HPI Anne is a 54-year-old female, originally from [place], briefly, she had undergone bilateral breast reconstruction (in [place]) has developed a left periprosthetic abscess/infection.  This bite multiple interventions including oral antibiotic therapy, and wound revisions she continues with problematic wound to the left breast/inframammary fold, continued drainage and is here for an opinion regarding treatment options.  Much of her treatment has been performed in [place], reports that she had also recently been seen by a plastic surgeon on [facility], and the recommendation has been to undergo removal of the implant, capsulectomy and deferred/delayed left breast reconstruction.  She reviewed, on her phone, serial photographs of the infected left breast with me, she has left us with 2 of the chart work from her prior procedures in [place] (in [place]ian).    Review of Systems    Constitutional:  Negative for chills and fever.   HENT:  Negative for hearing loss.    Eyes:  Positive for visual disturbance. Negative for discharge.   Respiratory:  Negative for chest tightness and shortness of breath.    Cardiovascular:  Negative for chest pain and leg swelling.   Gastrointestinal:  Negative for constipation, diarrhea and nausea.   Genitourinary:  Negative for dysuria.   Musculoskeletal:  Negative for gait problem.   Skin:  Positive for wound. Negative for rash.        Open wound left breast, lateral inframammary fold, with cloudy serosanguineous drainage, see photo in media   Allergic/Immunologic: Negative for immunocompromised state.   Neurological:  Negative for seizures and headaches.   Hematological:  Does not bruise/bleed easily.   Psychiatric/Behavioral:  Negative for dysphoric mood. The patient is not nervous/anxious.          Objective:     Physical Exam  HENT:      Head: Normocephalic and atraumatic.   Eyes:      Extraocular Movements: Extraocular movements intact.      Pupils: Pupils are equal, round, and reactive to light.   Cardiovascular:      Rate and Rhythm: Normal rate.   Pulmonary:      Effort: Pulmonary effort is normal.   Abdominal:      Palpations: Abdomen is soft.   Musculoskeletal:         General: Normal range of motion.      Cervical back: Normal range of motion and neck supple.   Skin:     General: Skin is warm.          Neurological:      Mental Status: She is alert and oriented to person, place, and time.   Psychiatric:         Mood and Affect: Mood normal.

## 2024-07-16 NOTE — PRE-PROCEDURE INSTRUCTIONS
Pre-Surgery Instructions:   Medication Instructions    ALPRAZolam (XANAX) 0.5 mg tablet Uses PRN- OK to take day of surgery    FLUoxetine (PROzac) 20 mg capsule Take day of surgery.    hydrOXYzine HCL (ATARAX) 25 mg tablet Take night before surgery if needed    NON FORMULARY Hold day of surgery.   Medication instructions for day surgery reviewed. Please use only a sip of water to take your instructed medications. Avoid all over the counter vitamins, supplements and NSAIDS for one week prior to surgery per anesthesia guidelines. Tylenol is ok to take as needed.     You will receive a call one business day prior to surgery with an arrival time and hospital directions. If your surgery is scheduled on a Monday, the hospital will be calling you on the Friday prior to your surgery. If you have not heard from anyone by 8pm, please call the hospital supervisor through the hospital  at [phone]. ([place] [phone] or [place] [phone]).    Do not eat or drink anything after midnight the night before your surgery, including candy, mints, lifesavers, or chewing gum. Do not drink alcohol 24hrs before your surgery. Try not to smoke at least 24hrs before your surgery.       Follow the pre surgery showering instructions as listed in the “My Surgical Experience Booklet” or otherwise provided by your surgeon's office. Do not use a blade to shave the surgical area 1 week before surgery. It is okay to use a clean electric clippers up to 24 hours before surgery. Do not apply any lotions, creams, including makeup, cologne, deodorant, or perfumes after showering on the day of your surgery. Do not use dry shampoo, hair spray, hair gel, or any type of hair products.     No contact lenses, eye make-up, or artificial eyelashes. Remove nail polish, including gel polish, and any artificial, gel, or acrylic nails if possible. Remove all jewelry including rings and body piercing jewelry.     Wear causal clothing that is easy  to take on and off. Consider your type of surgery.    Keep any valuables, jewelry, piercings at home. Please bring any specially ordered equipment (sling, braces) if indicated.    Arrange for a responsible person to drive you to and from the hospital on the day of your surgery. Please confirm the visitor policy for the day of your procedure when you receive your phone call with an arrival time.     Call the surgeon's office with any new illnesses, exposures, or additional questions prior to surgery.    Please reference your “My Surgical Experience Booklet” for additional information to prepare for your upcoming surgery.

## 2024-07-17 ENCOUNTER — ANESTHESIA EVENT (OUTPATIENT)
Dept: PERIOP | Facility: HOSPITAL | Age: 54
End: 2024-07-17
Payer: COMMERCIAL

## 2024-07-18 ENCOUNTER — ANESTHESIA (OUTPATIENT)
Dept: PERIOP | Facility: HOSPITAL | Age: 54
End: 2024-07-18
Payer: COMMERCIAL

## 2024-07-18 ENCOUNTER — HOSPITAL ENCOUNTER (OUTPATIENT)
Facility: HOSPITAL | Age: 54
Setting detail: OUTPATIENT SURGERY
Discharge: HOME | End: 2024-07-18
Attending: SURGERY | Admitting: SURGERY
Payer: COMMERCIAL

## 2024-07-18 ENCOUNTER — TELEPHONE (OUTPATIENT)
Age: 54
End: 2024-07-18

## 2024-07-18 VITALS
OXYGEN SATURATION: 97 % | RESPIRATION RATE: 12 BRPM | WEIGHT: 141.09 LBS | TEMPERATURE: 98.5 F | SYSTOLIC BLOOD PRESSURE: 121 MMHG | BODY MASS INDEX: 19.75 KG/M2 | HEART RATE: 54 BPM | DIASTOLIC BLOOD PRESSURE: 69 MMHG | HEIGHT: 71 IN

## 2024-07-18 DIAGNOSIS — Z15.01 BRCA1 POSITIVE: ICD-10-CM

## 2024-07-18 DIAGNOSIS — T85.79XD INFECTION OF BREAST IMPLANT, SUBSEQUENT ENCOUNTER: ICD-10-CM

## 2024-07-18 DIAGNOSIS — Z15.09 BRCA1 POSITIVE: ICD-10-CM

## 2024-07-18 PROCEDURE — 19371 PERI-IMPLT CAPSLC BRST COMPL: CPT | Performed by: PHYSICIAN ASSISTANT

## 2024-07-18 PROCEDURE — 19371 PERI-IMPLT CAPSLC BRST COMPL: CPT | Performed by: SURGERY

## 2024-07-18 PROCEDURE — 88300 SURGICAL PATH GROSS: CPT | Performed by: SPECIALIST

## 2024-07-18 RX ORDER — CEFAZOLIN SODIUM 1 G/50ML
SOLUTION INTRAVENOUS AS NEEDED
Status: DISCONTINUED | OUTPATIENT
Start: 2024-07-18 | End: 2024-07-18

## 2024-07-18 RX ORDER — CEFAZOLIN SODIUM 1 G/3ML
INJECTION, POWDER, FOR SOLUTION INTRAMUSCULAR; INTRAVENOUS AS NEEDED
Status: DISCONTINUED | OUTPATIENT
Start: 2024-07-18 | End: 2024-07-18

## 2024-07-18 RX ORDER — MIDAZOLAM HYDROCHLORIDE 2 MG/2ML
INJECTION, SOLUTION INTRAMUSCULAR; INTRAVENOUS AS NEEDED
Status: DISCONTINUED | OUTPATIENT
Start: 2024-07-18 | End: 2024-07-18

## 2024-07-18 RX ORDER — SODIUM CHLORIDE, SODIUM LACTATE, POTASSIUM CHLORIDE, CALCIUM CHLORIDE 600; 310; 30; 20 MG/100ML; MG/100ML; MG/100ML; MG/100ML
125 INJECTION, SOLUTION INTRAVENOUS CONTINUOUS
Status: DISCONTINUED | OUTPATIENT
Start: 2024-07-18 | End: 2024-07-18 | Stop reason: HOSPADM

## 2024-07-18 RX ORDER — OXYCODONE HYDROCHLORIDE AND ACETAMINOPHEN 5; 325 MG/1; MG/1
1 TABLET ORAL EVERY 4 HOURS PRN
Qty: 18 TABLET | Refills: 0 | Status: SHIPPED | OUTPATIENT
Start: 2024-07-18

## 2024-07-18 RX ORDER — LIDOCAINE HYDROCHLORIDE 10 MG/ML
INJECTION, SOLUTION EPIDURAL; INFILTRATION; INTRACAUDAL; PERINEURAL AS NEEDED
Status: DISCONTINUED | OUTPATIENT
Start: 2024-07-18 | End: 2024-07-18

## 2024-07-18 RX ORDER — DEXAMETHASONE SODIUM PHOSPHATE 10 MG/ML
INJECTION, SOLUTION INTRAMUSCULAR; INTRAVENOUS AS NEEDED
Status: DISCONTINUED | OUTPATIENT
Start: 2024-07-18 | End: 2024-07-18

## 2024-07-18 RX ORDER — ONDANSETRON 2 MG/ML
INJECTION INTRAMUSCULAR; INTRAVENOUS AS NEEDED
Status: DISCONTINUED | OUTPATIENT
Start: 2024-07-18 | End: 2024-07-18

## 2024-07-18 RX ORDER — FENTANYL CITRATE 50 UG/ML
INJECTION, SOLUTION INTRAMUSCULAR; INTRAVENOUS AS NEEDED
Status: DISCONTINUED | OUTPATIENT
Start: 2024-07-18 | End: 2024-07-18

## 2024-07-18 RX ORDER — PROPOFOL 10 MG/ML
INJECTION, EMULSION INTRAVENOUS AS NEEDED
Status: DISCONTINUED | OUTPATIENT
Start: 2024-07-18 | End: 2024-07-18

## 2024-07-18 RX ORDER — OXYCODONE HYDROCHLORIDE AND ACETAMINOPHEN 5; 325 MG/1; MG/1
1 TABLET ORAL EVERY 4 HOURS PRN
Status: DISCONTINUED | OUTPATIENT
Start: 2024-07-18 | End: 2024-07-18 | Stop reason: HOSPADM

## 2024-07-18 RX ADMIN — CEFAZOLIN 1000 MG: 1 INJECTION, POWDER, FOR SOLUTION INTRAMUSCULAR; INTRAVENOUS at 13:28

## 2024-07-18 RX ADMIN — MIDAZOLAM 2 MG: 1 INJECTION INTRAMUSCULAR; INTRAVENOUS at 13:05

## 2024-07-18 RX ADMIN — SODIUM CHLORIDE, SODIUM LACTATE, POTASSIUM CHLORIDE, AND CALCIUM CHLORIDE: .6; .31; .03; .02 INJECTION, SOLUTION INTRAVENOUS at 11:58

## 2024-07-18 RX ADMIN — ONDANSETRON 4 MG: 2 INJECTION INTRAMUSCULAR; INTRAVENOUS at 13:59

## 2024-07-18 RX ADMIN — FENTANYL CITRATE 25 MCG: 50 INJECTION INTRAMUSCULAR; INTRAVENOUS at 13:23

## 2024-07-18 RX ADMIN — FENTANYL CITRATE 25 MCG: 50 INJECTION INTRAMUSCULAR; INTRAVENOUS at 13:07

## 2024-07-18 RX ADMIN — DEXAMETHASONE SODIUM PHOSPHATE 8 MG: 10 INJECTION, SOLUTION INTRAMUSCULAR; INTRAVENOUS at 13:22

## 2024-07-18 RX ADMIN — PROPOFOL 200 MG: 10 INJECTION, EMULSION INTRAVENOUS at 13:15

## 2024-07-18 RX ADMIN — FENTANYL CITRATE 25 MCG: 50 INJECTION INTRAMUSCULAR; INTRAVENOUS at 13:59

## 2024-07-18 RX ADMIN — LIDOCAINE HYDROCHLORIDE 40 MG: 10 INJECTION, SOLUTION EPIDURAL; INFILTRATION; INTRACAUDAL; PERINEURAL at 13:14

## 2024-07-18 RX ADMIN — FENTANYL CITRATE 25 MCG: 50 INJECTION INTRAMUSCULAR; INTRAVENOUS at 14:02

## 2024-07-18 NOTE — ANESTHESIA POSTPROCEDURE EVALUATION
Post-Op Assessment Note    CV Status:  Stable  Pain Score: 0    Pain management: adequate       Mental Status:  Alert and awake   Hydration Status:  Euvolemic   PONV Controlled:  Controlled   Airway Patency:  Patent  Airway: intubated (LMA)     Post Op Vitals Reviewed: Yes    No anethesia notable event occurred.    Staff: Anesthesiologist, CRNA               BP      Temp      Pulse     Resp      SpO2

## 2024-07-18 NOTE — ANESTHESIA PREPROCEDURE EVALUATION
Procedure:  EXPLORATION LEFT BREAST, LEFT BREAST IMPLANT REMOVAL AND CAPSULECTOMY (Left: Breast)    Relevant Problems   NEURO/PSYCH   (+) Anxiety and depression      Neurology/Sleep   (+) Primary insomnia      Oncology   (+) S/P mastectomy, bilateral      Hx PONV  she had undergone bilateral breast reconstruction (in [place]) has developed a left periprosthetic abscess/infection  Visual disturbance      Physical Exam    Airway    Mallampati score: II  TM Distance: >3 FB  Neck ROM: full     Dental   No notable dental hx     Cardiovascular      Pulmonary      Other Findings  post-pubertal.      Anesthesia Plan  ASA Score- 2     Anesthesia Type- general with ASA Monitors.         Additional Monitors:     Airway Plan: LMA.           Plan Factors-    Chart reviewed.   Existing labs reviewed. Patient summary reviewed.    Patient is not a current smoker.              Induction- intravenous.    Postoperative Plan- . Planned trial extubation        Informed Consent- Anesthetic plan and risks discussed with patient.  I personally reviewed this patient with the CRNA. Discussed and agreed on the Anesthesia Plan with the CRNA..

## 2024-07-18 NOTE — TELEPHONE ENCOUNTER
Patient stated her insurance had not given approval for her surgery as we had forwarded papers saying the surgery is cosmetic.    Patient stating surgery is concerning abscess of the breast and should not be considered cosmetic.    Would like us to get corrected asap.

## 2024-07-18 NOTE — DISCHARGE INSTR - AVS FIRST PAGE
[facility]  Dr. ZEESHAN Watt Jr.  [address] [place] [address]  Phone: [phone]     Postoperative Instructions for Outpatient Surgery     These instructions are being provided by your doctor to give you basic guidelines during your post-op recovery. Please let our office know if your contact information has changed.      Please call the office today for an appointment on Monday 7/22 for a dressing change.      Dressings: Keep clean and dry until seen in the office.     Activity Restrictions: Nothing strenuous.      Bathing:  Sponge bathe only until seen in the office.      Medications:    Resume pre-op medications.   You may take tylenol, aleve, or ibuprofen for pain control             Percocet as needed for pain.     Other: May apply ice to left breast at 15 min intervals as needed for pain. Please track and record drain output daily.

## 2024-07-18 NOTE — OP NOTE
OPERATIVE REPORT  PATIENT NAME: Anne Calderón    :  1970  MRN: [id number]  Pt Location: [facility] OR ROOM 03    SURGERY DATE: 2024    Surgeons and Role:     * eKmal Watt MD - Primary     * Claudia Arriaza PA-C - Assisting    Preop Diagnosis:  Infection of breast implant, subsequent encounter [T85.79XD]  BRCA1 positive [Z15.01, Z15.09]    Post-Op Diagnosis Codes:     * Infection of breast implant, subsequent encounter [T85.79XD]     * BRCA1 positive [Z15.01, Z15.09]    Procedure(s):  Left - EXPLORATION LEFT BREAST. LEFT BREAST IMPLANT REMOVAL AND CAPSULECTOMY    Specimen(s):  ID Type Source Tests Collected by Time Destination   1 : Left breast implant Other Surgical Hardware/Implant TISSUE EXAM Kemal Watt MD 2024 1345        Estimated Blood Loss:   20 mL    Drains:  Closed/Suction Drain Left;Lateral Breast 15 Fr. (Active)   Number of days: 0       Anesthesia Type:   General    Operative Indications:  Infection of breast implant, subsequent encounter [T85.79XD]  BRCA1 positive [Z15.01, Z15.09]      Operative Findings:  As above      Complications:   None    Procedure and Technique:  Anne was seen preoperatively in the holding area, the surgical site was marked with her participation.  I reviewed with her the planned procedure, potential risks, complications and limitations.  She was taken to the operating room and underwent induction of general anesthesia by the anesthesia personnel.  The operative field was then prepped and draped in sterile fashion.  A proper timeout was performed.  The lateral aspect of the left mastectomy scar, within the inframammary fold, was marked to be excised to incorporate the open wound and granulation tissue.  Local anesthesia was administered.  A 15 blade used to create skin incision these were carried down to the dermis, the Bovie cautery was then used to dissect through the subcutaneous tissue down to the underlying implant capsule.  The breast implant  shell had been compromised, and breast implant material and shell were then removed in a piecemeal fashion.  Exploration of the implant pocket did not reveal drainage/fluid or any other obvious sign of infection.  The wound was copiously irrigated with antibiotic solution and the capsule excised with the Bovie cautery, this was performed in a piecemeal fashion with scoring of the capsule and hemostasis was obtained throughout utilizing the Bovie cautery.  Upon completion of the capsulectomy the wound was copiously irrigated again and hemostasis was assured.  A 15 [place] drain was placed and secured with a 3-0 nylon drain stitch.  The breast wound was then closed with 2-0 and 3-0 Vicryl sutures buried at the level of the deep dermis this was followed by running subcuticular strata fix.  Exofin, dry sterile dressing and a well-padded surgical bra were then applied and the patient was transferred to the recovery room.   I was present for the entire procedure. and A qualified resident physician was not available.  The physician assistant provided assistance with retraction, exposure, hemostasis and wound closure.  This increased operating efficiency and decreased operating time.    Patient Disposition:  PACU         SIGNATURE: Kemal Watt MD  DATE: July 18, 2024  TIME: 2:11 PM

## 2024-07-18 NOTE — INTERIM OP NOTE
EXPLORATION LEFT BREAST, LEFT BREAST IMPLANT REMOVAL AND CAPSULECTOMY  Postoperative Note  PATIENT NAME: Anne Calderón  : 1970  MRN: [id number]  [facility] OR ROOM 03    Surgery Date: 2024    Preop Diagnosis:  Infection of breast implant, subsequent encounter [T85.79XD]  BRCA1 positive [Z15.01, Z15.09]    Post-Op Diagnosis Codes:     * Infection of breast implant, subsequent encounter [T85.79XD]     * BRCA1 positive [Z15.01, Z15.09]    Procedure(s) (LRB):  EXPLORATION LEFT BREAST, LEFT BREAST IMPLANT REMOVAL AND CAPSULECTOMY (Left)    Surgeons and Role:     * Kemal Watt MD - Primary     * Claudia Arriaza PA-C - Assisting    Specimens:  ID Type Source Tests Collected by Time Destination   1 : Left breast implant Other Surgical Hardware/Implant TISSUE EXAM Kemal Watt MD 2024 1345        Estimated Blood Loss:   20 mL    Anesthesia Type:   General     Findings:    Infected left breast implant  Complications:   None      SIGNATURE: Claudia Arriaza PA-C   DATE: 2024   TIME: 2:29 PM

## 2024-07-22 ENCOUNTER — OFFICE VISIT (OUTPATIENT)
Dept: PLASTIC SURGERY | Facility: CLINIC | Age: 54
End: 2024-07-22

## 2024-07-22 DIAGNOSIS — Z42.1 AFTERCARE POSTMASTECTOMY FOR BREAST RECONSTRUCTION: Primary | ICD-10-CM

## 2024-07-22 PROCEDURE — 99024 POSTOP FOLLOW-UP VISIT: CPT | Performed by: PHYSICIAN ASSISTANT

## 2024-07-22 NOTE — PROGRESS NOTES
Assessment and Plan:  Anne Calderón 54 y.o. female s/p left breast exploration, left breast implant removal and capsulectomy 7/18/24, presenting for first post op visit    - dressings removed. Healing well  - no fevers/chills  - drain output approximately 20cc per day. Will have patient return at 1 week robert for probable drain removal  - Aquaphor to incision. Gauze fluff for compression.   - continue surgical bra  - Call if questions/concerns prior to next appointment.     Subjective:  Doing well.  Denies fevers/chills. No pain. Drain with 20cc output daily.     Objective:  NAD, AAOx3  Incision C/D/I, no s/s of seroma, drain with serosanguinous output, no bruising    Bree Potts PA-C

## 2024-07-23 ENCOUNTER — NURSE TRIAGE (OUTPATIENT)
Age: 54
End: 2024-07-23

## 2024-07-23 ENCOUNTER — TELEPHONE (OUTPATIENT)
Age: 54
End: 2024-07-23

## 2024-07-23 NOTE — TELEPHONE ENCOUNTER
"Pt calling in post 7/18 left breast implant removal and capsulectomy.  States since about yesterday developed \"big pain on scar\", adds now scar/surrounding area has become red, using a cream with hyaluronic acid that she got in [place], adds it feels like sunburn. Is concerned as she reports she had previous cellulitis. States pain is about 6-7/10, took advil and otherwise not taking pain meds since Saturday.     Denies fever or other sx, drainage serosanguineous. Agreed to send pictures of area and also cream for review.     Routing for review.     Answer Assessment - Initial Assessment Questions  1. SYMPTOM: \"What's the main symptom you're concerned about?\" (e.g., redness, pain, drainage)      Redness to incision  2. ONSET: \"When did sx  start?\"      yesterday  3. SURGERY: \"What surgery was performed?\"      EXPLORATION LEFT BREAST, LEFT BREAST IMPLANT REMOVAL AND CAPSULECTOMY  4. DATE of SURGERY: \"When was surgery performed?\"       7/18  5. INCISION SITE: \"Where is the incision located?\"       Left breast  6. REDNESS: \"Is there any redness at the incision site?\" If yes, ask: \"How wide across is the redness?\" (Inches, centimeters)       Pink, lower than incision, red around it, where elastic is   7. PAIN: \"Is there any pain?\" If Yes, ask: \"How bad is it?\"  (Scale 1-10; or mild, moderate, severe)      6-7/10, not currently taking anything, last night took advil, hard to say when pain started, maybe yesterday afternoon  8. BLEEDING: \"Is there any bleeding?\" If Yes, ask: \"How much?\" and \"Where?\"      no  9. DRAINAGE: \"Is there any drainage from the incision site?\" If yes, ask: \"What color and how much?\" (e.g., red, cloudy, pus; drops, teaspoon)      Red fluid in drain  10. FEVER: \"Do you have a fever?\" If Yes, ask: \"What is your temperature, how was it measured, and when did it start?\"        no  11. OTHER SYMPTOMS: \"Do you have any other symptoms?\" (e.g., shaking chills, weakness, rash elsewhere on body)        " no    Protocols used: Post-Op Incision Symptoms and Questions-ADULT-[place]

## 2024-07-26 ENCOUNTER — OFFICE VISIT (OUTPATIENT)
Dept: PLASTIC SURGERY | Facility: HOSPITAL | Age: 54
End: 2024-07-26

## 2024-07-26 DIAGNOSIS — Z98.86 STATUS POST BREAST IMPLANT REMOVAL: Primary | ICD-10-CM

## 2024-07-26 PROCEDURE — 99024 POSTOP FOLLOW-UP VISIT: CPT | Performed by: SURGERY

## 2024-07-26 NOTE — PROGRESS NOTES
Anne presents today in follow-up, 8 days status post removal of left breast implant, and capsulectomy.  Overall, she is doing quite well.  She reports that her drain has been patent for less than 15 cc daily for the last 2 to 3 days.  The dressings were removed, the site is healing nicely, her drain was removed.  Dressings were reapplied, she is instructed to continue to utilize fluffs and ABD within the surgical bra, she will be seen again in 1 week for suture removal and initiation of scar care.

## 2024-08-01 ENCOUNTER — OFFICE VISIT (OUTPATIENT)
Dept: PLASTIC SURGERY | Facility: CLINIC | Age: 54
End: 2024-08-01

## 2024-08-01 DIAGNOSIS — Z42.1 AFTERCARE POSTMASTECTOMY FOR BREAST RECONSTRUCTION: ICD-10-CM

## 2024-08-01 DIAGNOSIS — Z98.86 STATUS POST BREAST IMPLANT REMOVAL: Primary | ICD-10-CM

## 2024-08-01 DIAGNOSIS — T85.79XA: ICD-10-CM

## 2024-08-01 LAB
DME PARACHUTE DELIVERY DATE REQUESTED: NORMAL
DME PARACHUTE ITEM DESCRIPTION: NORMAL
DME PARACHUTE ORDER STATUS: NORMAL
DME PARACHUTE SUPPLIER NAME: NORMAL
DME PARACHUTE SUPPLIER PHONE: NORMAL

## 2024-08-01 PROCEDURE — 99024 POSTOP FOLLOW-UP VISIT: CPT

## 2024-08-02 NOTE — PROGRESS NOTES
[facility]astic [facility]  [address], [address], [place] [address]  [phone]    Patient Identification: Anne Calderón is a 54 y.o. female     History of Present Illness: The patient is a 54 y.o.  year-old female  who presents to the office for post-op visit. Patient is 14 days s/p Exploration Left Breast, Left Breast Implant Removal And Capsulectomy - Left  on 7/18/2024 by Dr. Watt.  Patient states she is doing well at this time. She denies fevers, chills, signs of infection or significant pain. She is wearing compression bra at all times and limiting arm activities. Applying Aquaphor daily to incision site.   Patient has no complaints at this time.    Past Medical History:   Diagnosis Date    Anxiety     Arthritis     Pneumonia     PONV (postoperative nausea and vomiting)     Visual impairment           Review of Systems  Constitutional: Denies fevers, chills or pain.  Skin: Denies any warmth, erythema, edema or mucopurulent drainage.     Physical Exam    Left Breast: Surgical incisions are clean, dry, and intact. Suture removed. Skin perfusion is intact. Expected amount of post-operative edema. There are no signs of infection, obvious hematoma, seroma or wound dehiscence.     Assessment and Plan:  The patient is an 54 y.o.  year-old female who presents to the office for post-op visit. Patient is 14 days s/p Exploration Left Breast, Left Breast Implant Removal And Capsulectomy - Left  on 7/18/2024 by Dr. Watt      -At today's visit sutures removed. Pt tolerated well. Incision healing well. Breast is soft, no signs of infection. She may continue to apply aquaphor daily.   -Discussed daily scar massage and use of silicone scar gel/tape to promote scar softening and flattening. The patient was educated on scar care and that it can take up to 1 year for full scar maturation.  -Continue wearing surgical compression bra at all times. Patient is to refrain from  exercise/repetitive arm movements for 4-6 weeks post-op. Sleep on back at an incline. No submerging in water (pools, baths, hottubs, etc.)   -The patient is to return for scar check in 3-4 weeks or sooner if necessary.   -The patient is to call the office with any questions or concerns. All of the patient's questions were answered at this time and they agree with the plan of care.      Amy Maddox PA-C  [facility] Plastic and Reconstructive Surgery

## 2024-08-05 ENCOUNTER — TELEPHONE (OUTPATIENT)
Dept: OBGYN CLINIC | Facility: OTHER | Age: 54
End: 2024-08-05

## 2024-08-13 ENCOUNTER — OFFICE VISIT (OUTPATIENT)
Dept: PLASTIC SURGERY | Facility: CLINIC | Age: 54
End: 2024-08-13

## 2024-08-13 DIAGNOSIS — Z90.13 S/P MASTECTOMY, BILATERAL: Primary | ICD-10-CM

## 2024-08-13 PROCEDURE — 99024 POSTOP FOLLOW-UP VISIT: CPT | Performed by: PHYSICIAN ASSISTANT

## 2024-08-13 NOTE — PROGRESS NOTES
Assessment/Plan:     Diagnoses and all orders for this visit:    S/P mastectomy, bilateral  See HPI. She is doing well. Incision is healing nicely. She will continue with scar away. Skin is somewhat contracted. We will see her back in 1 month.        Subjective:      Patient ID: Anne Calderón is a 54 y.o. female.    HPI    Pt is here for a post-op visit. She underwent exploration left breast, left breast implant removal & capsulectomy on 7/18 by Dr. Watt. She states she is doing well without complaints.   Previous history: she had undergone bilateral breast reconstruction (in [place]) & developed a left periprosthetic abscess/infection. Despite multiple interventions, there was no improvement & it was recommended she have the implant removed.     Patient Active Problem List   Diagnosis    Cellulitis    Anxiety and depression    BRCA1 gene mutation positive    Primary insomnia    Menopausal symptoms    S/P mastectomy, bilateral     No Known Allergies  Current Outpatient Medications on File Prior to Visit   Medication Sig    ALPRAZolam (XANAX) 0.5 mg tablet Take 0.5 mg by mouth daily at bedtime as needed for anxiety    FLUoxetine (PROzac) 20 mg capsule Take 1 capsule (20 mg total) by mouth daily    hydrOXYzine HCL (ATARAX) 25 mg tablet Take 1 tablet (25 mg total) by mouth every 6 (six) hours as needed for itching (Patient taking differently: Take 12.5 mg by mouth daily at bedtime)    mupirocin (BACTROBAN) 2 % ointment Apply topically 3 (three) times a day    NON FORMULARY 1 capsule in the morning Lidial/Tibolone-Hormone replacement    oxyCODONE-acetaminophen (PERCOCET) 5-325 mg per tablet Take 1 tablet by mouth every 4 (four) hours as needed for moderate pain for up to 18 doses Max Daily Amount: 6 tablets     No current facility-administered medications on file prior to visit.     Family History   Problem Relation Age of Onset    Breast cancer Mother     Cancer Father     Arthritis Sister     Breast cancer Sister      Arthritis Brother      Past Medical History:   Diagnosis Date    Anxiety     Arthritis     Pneumonia     PONV (postoperative nausea and vomiting)     Visual impairment      Social History     Socioeconomic History    Marital status: /Civil Union     Spouse name: Not on file    Number of children: Not on file    Years of education: Not on file    Highest education level: Not on file   Occupational History    Not on file   Tobacco Use    Smoking status: Never    Smokeless tobacco: Never    Tobacco comments:     Sometimes Sigars or Pipe but very seldom   Vaping Use    Vaping status: Never Used   Substance and Sexual Activity    Alcohol use: Yes     Comment: seldom    Drug use: Never    Sexual activity: Yes     Partners: Male     Birth control/protection: Post-menopausal     Comment: Preventive Ovarectomy   Other Topics Concern    Not on file   Social History Narrative    Not on file     Social Determinants of Health     Financial Resource Strain: Not on file   Food Insecurity: Not on file   Transportation Needs: Not on file   Physical Activity: Not on file   Stress: Not on file   Social Connections: Not on file   Intimate Partner Violence: Not on file   Housing Stability: Not on file     Past Surgical History:   Procedure Laterality Date    APPENDECTOMY      BILATERAL OOPHORECTOMY      prophylactic    BREAST IMPLANT  2012    implant exchange    BREAST RECONSTRUCTION Bilateral 2006    in [place]    COLONOSCOPY  2020    COLONOSCOPY  2015    KNEE SURGERY Left 2012    meniscus/ACL    MASTECTOMY Bilateral 2006    prophylactic - in [place]    MS REMOVAL INTACT BREAST IMPLANT Left 7/18/2024    Procedure: EXPLORATION LEFT BREAST, LEFT BREAST IMPLANT REMOVAL AND CAPSULECTOMY;  Surgeon: Kemal Watt MD;  Location:  MAIN OR;  Service: Plastics    REVISION RECONSTRUCTED BREAST Left 03/2024    in [place]         Review of Systems   All other systems reviewed and are negative.        Objective:      LMP  (LMP Unknown)           Physical Exam  Constitutional:       Appearance: Normal appearance. She is well-developed.   HENT:      Head: Normocephalic and atraumatic.   Eyes:      Conjunctiva/sclera: Conjunctivae normal.   Pulmonary:      Effort: Pulmonary effort is normal.      Comments: Right breast s/p reconstruction with implant. Left breast/ chest incision is healing nicely, skin somewhat contracted, see picture in media.   Musculoskeletal:         General: Normal range of motion.      Cervical back: Normal range of motion.   Skin:     General: Skin is warm and dry.   Neurological:      Mental Status: She is alert and oriented to person, place, and time.   Psychiatric:         Mood and Affect: Mood normal.         Behavior: Behavior normal.          [Follow-Up Visit] : a follow-up visit for [Shoulder Pain] : shoulder pain

## 2024-08-15 ENCOUNTER — OFFICE VISIT (OUTPATIENT)
Dept: OBGYN CLINIC | Facility: CLINIC | Age: 54
End: 2024-08-15
Payer: COMMERCIAL

## 2024-08-15 VITALS
BODY MASS INDEX: 20.58 KG/M2 | HEIGHT: 71 IN | SYSTOLIC BLOOD PRESSURE: 108 MMHG | WEIGHT: 147 LBS | DIASTOLIC BLOOD PRESSURE: 70 MMHG

## 2024-08-15 DIAGNOSIS — N95.1 MENOPAUSAL SYMPTOMS: ICD-10-CM

## 2024-08-15 PROCEDURE — 99203 OFFICE O/P NEW LOW 30 MIN: CPT | Performed by: OBSTETRICS & GYNECOLOGY

## 2024-08-15 RX ORDER — NORETHINDRONE ACETATE AND ETHINYL ESTRADIOL .5; 2.5 MG/1; UG/1
1 TABLET ORAL DAILY
Qty: 90 TABLET | Refills: 1 | Status: SHIPPED | OUTPATIENT
Start: 2024-08-15

## 2024-08-15 NOTE — ASSESSMENT & PLAN NOTE
"We discussed the use of bioidentical vs. Synthetic hormones. We reviewed that the FDA does not endorse use of bioidentical hormones though this may be an option that she can pursue from another provider. We reviewed that the FDA discontinued study on Tibolone in 2006 due to the increased risk of stroke and vertebral fractures. Therefore, she was counseled on the use of combined hormonal therapy of both estrogen and progesterone (as she still has a uterus) for continued management of her symptoms. We also reviewed that at a certain age, she may wish to consider discontinuing her HRT and \"going through menopause\" but she wishes to continue for now. Offered referral to menopause specialist if desired. For now, she has agreed to proceed with trial of FemHRT (low dose). All questions answered to patient's satisfaction. RTO for follow up and APE in 3 months.  "

## 2024-08-15 NOTE — PROGRESS NOTES
"Subjective:     Anne is a 53yo postmenopausal patient who has know BRCA 1 gene mutation. She therefore had a bilateral mastectomy and oophorectomy at the age of 46. She has had right breast reconstruction but absent left breast. She was instructed to start HRT given her young age of oophorectomy. She has taken a variety of medications depending on her home country at the time ([place], [place], [place] etc). She is currently taking Tibolone prescribed from [place]. She is aware that it is not available in the [place] and will be living here for the next 4 years and therefore was hoping to get something prescribed in the [place]. Her biggest symptom at this time is insomnia for which she also takes Atarax PRN. Tibolone has worked best for her though she has tried patches in the past.     Patient Active Problem List   Diagnosis    Cellulitis    Anxiety and depression    BRCA1 gene mutation positive    Primary insomnia    Menopausal symptoms    S/P mastectomy, bilateral     Past Medical History:   Diagnosis Date    Anxiety     Arthritis     Pneumonia     PONV (postoperative nausea and vomiting)     Visual impairment      Past Surgical History:   Procedure Laterality Date    APPENDECTOMY      BILATERAL OOPHORECTOMY      prophylactic    BREAST IMPLANT  2012    implant exchange    BREAST RECONSTRUCTION Bilateral 2006    in [place]    COLONOSCOPY  2020    COLONOSCOPY  2015    KNEE SURGERY Left 2012    meniscus/ACL    MASTECTOMY Bilateral 2006    prophylactic - in [place]    MA REMOVAL INTACT BREAST IMPLANT Left 7/18/2024    Procedure: EXPLORATION LEFT BREAST, LEFT BREAST IMPLANT REMOVAL AND CAPSULECTOMY;  Surgeon: Kemal Watt MD;  Location:  MAIN OR;  Service: Plastics    REVISION RECONSTRUCTED BREAST Left 03/2024    in [place]       Objective:    Vitals: Blood pressure 108/70, height 5' 11\" (1.803 m), weight 66.7 kg (147 lb).Body mass index is 20.5 kg/m².    Physical Exam  Vitals reviewed.   Constitutional:       General: She is " "not in acute distress.     Appearance: Normal appearance. She is well-developed. She is not ill-appearing, toxic-appearing or diaphoretic.   Cardiovascular:      Rate and Rhythm: Normal rate.   Pulmonary:      Effort: Pulmonary effort is normal. No respiratory distress.   Skin:     General: Skin is warm and dry.   Neurological:      Mental Status: She is alert and oriented to person, place, and time.   Psychiatric:         Mood and Affect: Mood normal.         Behavior: Behavior normal.         Assessment/Plan:    Problem List Items Addressed This Visit          Unprioritized    Menopausal symptoms     We discussed the use of bioidentical vs. Synthetic hormones. We reviewed that the FDA does not endorse use of bioidentical hormones though this may be an option that she can pursue from another provider. We reviewed that the FDA discontinued study on Tibolone in 2006 due to the increased risk of stroke and vertebral fractures. Therefore, she was counseled on the use of combined hormonal therapy of both estrogen and progesterone (as she still has a uterus) for continued management of her symptoms. We also reviewed that at a certain age, she may wish to consider discontinuing her HRT and \"going through menopause\" but she wishes to continue for now. Offered referral to menopause specialist if desired. For now, she has agreed to proceed with trial of FemHRT (low dose). All questions answered to patient's satisfaction. RTO for follow up and APE in 3 months.         Relevant Medications    norethindrone-ethinyl estradiol (Fyavolv) 0.5-2.5 MG-MCG per tablet         Emani Coates MD  OB/GYN  She/her  8/15/2024  6:15 PM      "

## 2024-08-21 DIAGNOSIS — F32.A ANXIETY AND DEPRESSION: ICD-10-CM

## 2024-08-21 DIAGNOSIS — F41.9 ANXIETY AND DEPRESSION: ICD-10-CM

## 2024-08-21 RX ORDER — HYDROXYZINE HYDROCHLORIDE 25 MG/1
25 TABLET, FILM COATED ORAL EVERY 6 HOURS PRN
Qty: 90 TABLET | Refills: 1 | Status: SHIPPED | OUTPATIENT
Start: 2024-08-21

## 2024-09-06 ENCOUNTER — RA CDI HCC (OUTPATIENT)
Dept: OTHER | Facility: HOSPITAL | Age: 54
End: 2024-09-06

## 2024-09-11 ENCOUNTER — OFFICE VISIT (OUTPATIENT)
Dept: PLASTIC SURGERY | Facility: CLINIC | Age: 54
End: 2024-09-11

## 2024-09-11 DIAGNOSIS — Z90.13 S/P MASTECTOMY, BILATERAL: Primary | ICD-10-CM

## 2024-09-11 PROCEDURE — 99024 POSTOP FOLLOW-UP VISIT: CPT | Performed by: PHYSICIAN ASSISTANT

## 2024-09-11 NOTE — PROGRESS NOTES
"Assessment/Plan:     Diagnoses and all orders for this visit:    S/P mastectomy, bilateral  See HPI. She is doing well. Incision is healing nicely. Skin is somewhat contracted but has more laxity then previous visit. She is uncertain if she wants to replace the implant or possibly remove the right implant. She will think about & can discuss further at next visit. We will see her back in 1 month.         Subjective:      Patient ID: Anne Calderón is a 54 y.o. female.    HPI    Pt is here for a post-op visit. She underwent exploration left breast, left breast implant removal & capsulectomy on 7/18 by Dr. Watt. She states she is doing well without complaints. She states she has been playing golf & with the implant removed, \"it's not in the way\".    Previous history: she had undergone bilateral breast reconstruction (in [place]) & developed a left periprosthetic abscess/infection. Despite multiple interventions, there was no improvement & it was recommended she have the implant removed.    Patient Active Problem List   Diagnosis    Cellulitis    Anxiety and depression    BRCA1 gene mutation positive    Primary insomnia    Menopausal symptoms    S/P mastectomy, bilateral     No Known Allergies  Current Outpatient Medications on File Prior to Visit   Medication Sig    ALPRAZolam (XANAX) 0.5 mg tablet Take 0.5 mg by mouth daily at bedtime as needed for anxiety    FLUoxetine (PROzac) 20 mg capsule Take 1 capsule (20 mg total) by mouth daily    hydrOXYzine HCL (ATARAX) 25 mg tablet TAKE 1 TABLET BY MOUTH EVERY 6 HOURS AS NEEDED FOR ITCHING.    mupirocin (BACTROBAN) 2 % ointment Apply topically 3 (three) times a day    NON FORMULARY 1 capsule in the morning Lidial/Tibolone-Hormone replacement    norethindrone-ethinyl estradiol (Fyavolv) 0.5-2.5 MG-MCG per tablet Take 1 tablet by mouth daily    oxyCODONE-acetaminophen (PERCOCET) 5-325 mg per tablet Take 1 tablet by mouth every 4 (four) hours as needed for moderate pain for " up to 18 doses Max Daily Amount: 6 tablets     No current facility-administered medications on file prior to visit.     Family History   Problem Relation Age of Onset    Breast cancer Mother              Cancer Father         Lung      Arthritis Sister     Breast cancer Sister         Positive BRCA1  and alive    Hyperlipidemia Sister     Osteoarthritis Sister     Arthritis Brother     Hyperlipidemia Brother     Osteoarthritis Brother      Past Medical History:   Diagnosis Date    Anxiety     Arthritis     Pneumonia     PONV (postoperative nausea and vomiting)     Visual impairment      Social History     Socioeconomic History    Marital status: /Civil Union     Spouse name: Not on file    Number of children: Not on file    Years of education: Not on file    Highest education level: Not on file   Occupational History    Not on file   Tobacco Use    Smoking status: Never    Smokeless tobacco: Never    Tobacco comments:     Sometimes Sigars or Pipe but very seldom   Vaping Use    Vaping status: Never Used   Substance and Sexual Activity    Alcohol use: Yes     Comment: seldom    Drug use: Never    Sexual activity: Yes     Partners: Male     Birth control/protection: Post-menopausal     Comment: Preventive Ovarectomy   Other Topics Concern    Not on file   Social History Narrative    Not on file     Social Determinants of Health     Financial Resource Strain: Not on file   Food Insecurity: Not on file   Transportation Needs: Not on file   Physical Activity: Not on file   Stress: Not on file   Social Connections: Not on file   Intimate Partner Violence: Not on file   Housing Stability: Not on file     Past Surgical History:   Procedure Laterality Date    APPENDECTOMY      BILATERAL OOPHORECTOMY      prophylactic    BREAST IMPLANT  2012    implant exchange    BREAST RECONSTRUCTION Bilateral 2006    in [place]    COLONOSCOPY  2020    COLONOSCOPY  2015    KNEE SURGERY Left 2012    meniscus/ACL     MASTECTOMY Bilateral 2006    prophylactic - in [place]    [place] REMOVAL INTACT BREAST IMPLANT Left 7/18/2024    Procedure: EXPLORATION LEFT BREAST, LEFT BREAST IMPLANT REMOVAL AND CAPSULECTOMY;  Surgeon: Kemal Watt MD;  Location:  MAIN OR;  Service: Plastics    REVISION RECONSTRUCTED BREAST Left 03/2024    in [place]         Review of Systems   All other systems reviewed and are negative.        Objective:      LMP  (LMP Unknown)          Physical Exam  Constitutional:       Appearance: Normal appearance. She is well-developed.   HENT:      Head: Normocephalic and atraumatic.   Eyes:      Conjunctiva/sclera: Conjunctivae normal.   Pulmonary:      Effort: Pulmonary effort is normal.      Comments: Left breast/ chest incision is C/D/I. The skin is still somewhat contracted but there is more laxity in the upper pole. See picture in media.  Musculoskeletal:         General: Normal range of motion.      Cervical back: Normal range of motion.   Skin:     General: Skin is warm and dry.   Neurological:      Mental Status: She is alert and oriented to person, place, and time.   Psychiatric:         Mood and Affect: Mood normal.         Behavior: Behavior normal.

## 2024-09-12 ENCOUNTER — OFFICE VISIT (OUTPATIENT)
Dept: FAMILY MEDICINE CLINIC | Facility: CLINIC | Age: 54
End: 2024-09-12
Payer: COMMERCIAL

## 2024-09-12 VITALS
TEMPERATURE: 96.3 F | HEART RATE: 96 BPM | DIASTOLIC BLOOD PRESSURE: 70 MMHG | OXYGEN SATURATION: 98 % | SYSTOLIC BLOOD PRESSURE: 90 MMHG | RESPIRATION RATE: 16 BRPM | BODY MASS INDEX: 19.8 KG/M2 | WEIGHT: 141.4 LBS | HEIGHT: 71 IN

## 2024-09-12 DIAGNOSIS — N95.1 MENOPAUSAL SYMPTOMS: ICD-10-CM

## 2024-09-12 DIAGNOSIS — H53.8 BLURRY VISION: ICD-10-CM

## 2024-09-12 DIAGNOSIS — E03.8 SUBCLINICAL HYPOTHYROIDISM: Primary | ICD-10-CM

## 2024-09-12 DIAGNOSIS — F32.A ANXIETY AND DEPRESSION: ICD-10-CM

## 2024-09-12 DIAGNOSIS — Z12.11 COLON CANCER SCREENING: ICD-10-CM

## 2024-09-12 DIAGNOSIS — F41.9 ANXIETY AND DEPRESSION: ICD-10-CM

## 2024-09-12 PROCEDURE — 99213 OFFICE O/P EST LOW 20 MIN: CPT | Performed by: FAMILY MEDICINE

## 2024-09-12 NOTE — PROGRESS NOTES
Ambulatory Visit  Name: Anne Calderón      : 1970      MRN: [id number]  Encounter Provider: Adam Khan DO  Encounter Date: 2024   Encounter department: [facility]    Assessment & Plan  Subclinical hypothyroidism  - Previous history of subclinical hypothyroidism.  Recently feeling like she feels a lump in her throat.  -On exam no thyroid nodules appreciated.  -Does note some symptoms of changes in her vision, unsure is from change in her prescription or weakening of the muscles that she is staring in her screen although now.  -Check thyroid labs, electrolytes blood counts as ordered.  -She notes she is sleeping well getting 7 to 8 hours a night  -Referral to ophthalmology for evaluation of vision  -Discussed dietary lifestyle modifications.  -Follow-up in 3 to 6 months as needed.    Orders:    TSH, 3rd generation with Free T4 reflex; Future    Blurry vision  As above.    Orders:    TSH, 3rd generation with Free T4 reflex; Future    Comprehensive metabolic panel; Future    CBC and differential; Future    Ambulatory Referral to Ophthalmology; Future    Colon cancer screening    Orders:    Ambulatory Referral to Gastroenterology; Future    Anxiety and depression  Stable.  Using Prozac as needed for situational anxiety at this point.  Has weaned off of the lorazepam and is using only hydroxyzine at night as needed.  Denies any SI or HI.         Menopausal symptoms  Following with OB/GYN.  Transition to Fyavolv as recommended.              History of Present Illness     nAne is 54-year-old female presents today for evaluation of concern for lump in her neck.  Does note she previously had history of subclinical hypothyroidism at some point however has never been on medication.  Recently had a friend who had lump in her thyroid and was found to be cancer.  She then started feeling like she had a lump in her neck and wanted to be evaluated.  No trouble swallowing.  No reflux  symptoms.  No burning sensations.  No changes in her voice.  She also notes some symptoms of changes in vision.  Noted some blurriness through the day.  She is working at this time and does admit to prolonged screen use daily.  She does wear glasses for reading and feels like her eye muscles may also be weakening.  Has not had updated eye exam in some time.  Denies any visual field loss.  No headaches.  No eye pain.  No double vision or floaters.  Denies any slurred speech, weakness, facial drooping.  Denies any unwanted weight loss or night sweats.  Does admit she has been exercising recently and this eating healthy diet.  She notes she does have low blood pressure normally and was low on initial check today.  On repeat still on the lower side of normal.  She is asymptomatic at this time.  We discussed her previous visit to OB/GYN and recommendations to transition to different medication for her menopausal symptoms.  She does admit she will be transitioning soon.  No other concerns today.      Review of Systems   Constitutional:  Negative for appetite change, chills, fatigue, fever and unexpected weight change.   HENT:  Negative for congestion, ear pain, sore throat, trouble swallowing and voice change.         Lump in her throat   Eyes:  Positive for visual disturbance. Negative for pain.   Respiratory:  Negative for cough and shortness of breath.    Cardiovascular:  Negative for chest pain and palpitations.   Gastrointestinal:  Negative for abdominal pain and vomiting.   Endocrine: Negative for polydipsia and polyuria.   Genitourinary:  Negative for difficulty urinating, dysuria and hematuria.   Musculoskeletal:  Negative for arthralgias and back pain.   Skin:  Negative for color change and rash.   Neurological:  Negative for dizziness, seizures, syncope, speech difficulty, weakness and headaches.   Psychiatric/Behavioral:  Negative for confusion, dysphoric mood, sleep disturbance and suicidal ideas. The patient  is nervous/anxious.    All other systems reviewed and are negative.    Past Medical History:   Diagnosis Date    Anxiety     Arthritis     Pneumonia     PONV (postoperative nausea and vomiting)     Visual impairment      Past Surgical History:   Procedure Laterality Date    APPENDECTOMY      BILATERAL OOPHORECTOMY      prophylactic    BREAST IMPLANT      implant exchange    BREAST RECONSTRUCTION Bilateral     in [place]    COLONOSCOPY      COLONOSCOPY      KNEE SURGERY Left 2012    meniscus/ACL    MASTECTOMY Bilateral 2006    prophylactic - in [place]    [place] REMOVAL INTACT BREAST IMPLANT Left 2024    Procedure: EXPLORATION LEFT BREAST, LEFT BREAST IMPLANT REMOVAL AND CAPSULECTOMY;  Surgeon: Kemal Watt MD;  Location:  MAIN OR;  Service: Plastics    REVISION RECONSTRUCTED BREAST Left 2024    in [place]     Family History   Problem Relation Age of Onset    Breast cancer Mother              Cancer Father         Lung      Arthritis Sister     Breast cancer Sister         Positive BRCA1  and alive    Hyperlipidemia Sister     Osteoarthritis Sister     Arthritis Brother     Hyperlipidemia Brother     Osteoarthritis Brother      Social History     Tobacco Use    Smoking status: Never    Smokeless tobacco: Never    Tobacco comments:     Sometimes Sigars or Pipe but very seldom   Vaping Use    Vaping status: Never Used   Substance and Sexual Activity    Alcohol use: Yes     Comment: seldom    Drug use: Never    Sexual activity: Yes     Partners: Male     Birth control/protection: Post-menopausal     Comment: Preventive Ovarectomy     Current Outpatient Medications on File Prior to Visit   Medication Sig    FLUoxetine (PROzac) 20 mg capsule Take 1 capsule (20 mg total) by mouth daily    hydrOXYzine HCL (ATARAX) 25 mg tablet TAKE 1 TABLET BY MOUTH EVERY 6 HOURS AS NEEDED FOR ITCHING.    NON FORMULARY 1 capsule in the morning Lidial/Tibolone-Hormone replacement     "norethindrone-ethinyl estradiol (Fyavolv) 0.5-2.5 MG-MCG per tablet Take 1 tablet by mouth daily    ALPRAZolam (XANAX) 0.5 mg tablet Take 0.5 mg by mouth daily at bedtime as needed for anxiety (Patient not taking: Reported on 9/12/2024)    [DISCONTINUED] mupirocin (BACTROBAN) 2 % ointment Apply topically 3 (three) times a day    [DISCONTINUED] oxyCODONE-acetaminophen (PERCOCET) 5-325 mg per tablet Take 1 tablet by mouth every 4 (four) hours as needed for moderate pain for up to 18 doses Max Daily Amount: 6 tablets     No Known Allergies    There is no immunization history on file for this patient.  Objective     BP 90/70   Pulse 96   Temp (!) 96.3 °F (35.7 °C) (Tympanic)   Resp 16   Ht 5' 11\" (1.803 m)   Wt 64.1 kg (141 lb 6.4 oz)   LMP  (LMP Unknown)   SpO2 98%   BMI 19.72 kg/m²     Physical Exam  Vitals and nursing note reviewed.   Constitutional:       General: She is not in acute distress.     Appearance: Normal appearance.   HENT:      Head: Normocephalic and atraumatic.      Right Ear: Tympanic membrane and external ear normal.      Left Ear: Tympanic membrane and external ear normal.      Nose: Nose normal.      Mouth/Throat:      Mouth: Mucous membranes are moist.   Eyes:      Extraocular Movements: Extraocular movements intact.      Conjunctiva/sclera: Conjunctivae normal.      Pupils: Pupils are equal, round, and reactive to light.   Neck:      Thyroid: No thyroid mass, thyromegaly or thyroid tenderness.      Trachea: Trachea and phonation normal. No tracheal deviation.   Cardiovascular:      Rate and Rhythm: Normal rate and regular rhythm.      Pulses: Normal pulses.      Heart sounds: Normal heart sounds. No murmur heard.  Pulmonary:      Effort: Pulmonary effort is normal.      Breath sounds: Normal breath sounds. No wheezing, rhonchi or rales.   Abdominal:      General: Bowel sounds are normal.      Palpations: Abdomen is soft.      Tenderness: There is no abdominal tenderness. There is no " guarding.   Musculoskeletal:         General: Normal range of motion.      Cervical back: Normal range of motion.      Right lower leg: No edema.      Left lower leg: No edema.   Lymphadenopathy:      Cervical: No cervical adenopathy.   Skin:     General: Skin is warm.      Capillary Refill: Capillary refill takes less than 2 seconds.   Neurological:      General: No focal deficit present.      Mental Status: She is alert and oriented to person, place, and time.   Psychiatric:         Mood and Affect: Mood normal.         Behavior: Behavior normal.

## 2024-09-12 NOTE — ASSESSMENT & PLAN NOTE
As above.    Orders:    TSH, 3rd generation with Free T4 reflex; Future    Comprehensive metabolic panel; Future    CBC and differential; Future    Ambulatory Referral to Ophthalmology; Future

## 2024-09-12 NOTE — ASSESSMENT & PLAN NOTE
Stable.  Using Prozac as needed for situational anxiety at this point.  Has weaned off of the lorazepam and is using only hydroxyzine at night as needed.  Denies any SI or HI.

## 2024-09-12 NOTE — ASSESSMENT & PLAN NOTE
- Previous history of subclinical hypothyroidism.  Recently feeling like she feels a lump in her throat.  -On exam no thyroid nodules appreciated.  -Does note some symptoms of changes in her vision, unsure is from change in her prescription or weakening of the muscles that she is staring in her screen although now.  -Check thyroid labs, electrolytes blood counts as ordered.  -She notes she is sleeping well getting 7 to 8 hours a night  -Referral to ophthalmology for evaluation of vision  -Discussed dietary lifestyle modifications.  -Follow-up in 3 to 6 months as needed.    Orders:    TSH, 3rd generation with Free T4 reflex; Future

## 2024-09-18 ENCOUNTER — NEW PATIENT (OUTPATIENT)
Dept: URBAN - METROPOLITAN AREA CLINIC 6 | Facility: CLINIC | Age: 54
End: 2024-09-18

## 2024-09-18 DIAGNOSIS — H04.123: ICD-10-CM

## 2024-09-18 DIAGNOSIS — H52.13: ICD-10-CM

## 2024-09-18 PROCEDURE — 92015 DETERMINE REFRACTIVE STATE: CPT

## 2024-09-18 PROCEDURE — 92002 INTRM OPH EXAM NEW PATIENT: CPT

## 2024-09-18 ASSESSMENT — VISUAL ACUITY
OU_CC: J1+
OS_CC: 20/20
OD_CC: 20/30-2

## 2024-09-18 ASSESSMENT — TONOMETRY
OS_IOP_MMHG: 12
OD_IOP_MMHG: 11

## 2024-09-23 ENCOUNTER — CLINICAL SUPPORT (OUTPATIENT)
Dept: OBGYN CLINIC | Facility: OTHER | Age: 54
End: 2024-09-23

## 2024-09-23 DIAGNOSIS — Z98.86 BREAST IMPLANT REMOVAL STATUS: Primary | ICD-10-CM

## 2024-09-23 NOTE — PROGRESS NOTES
Mastectomy Bra Fitting Order Details    Anne Calderón  1970  [id number]    Reason For Visit  Mastectomy bra and prosthesis.     Precautions  Prophylactic bilateral mastectomy     Subjective  Anne underwent a prophylactic bilateral mastectomy in 2006. She had them replaced in 2012 and 2024. She had an infection on the left side. She recently removed her L implant due to this. She did not have her R implant removed and is wearing compression bra currently. Pre removal she is wearing a 34D.     Surgery Type: Mastectomy    Lymph node removal no     Date of surgery 2006    Surgical side bilateral    Objective  Anne has a well healed chest wall with some excess tissue remaining on left side. She would benefit from an adapt air to achieve symmetry and accommodate remaining tissue.     Assessment  Cup - 16 x 2 = 32 + 4 = 36  Band - 19  x 2 = 38    Plan  Ship remainder of her order to home. Anne was educated on the wear and care of her products.     Order Details   bilateral mastectomy 2006 prophylactic  Heidi small x 2  Briana small nude x 1  valetta size 6 nude x 1  valetta size 6 black x 1  ju 38A off white x 1 - received on 9/23/24  adapt air light style 327 size 6 x 1 - received on 9/23/24  Remainder ship to home

## 2024-09-27 LAB
ALBUMIN SERPL-MCNC: 4.3 G/DL (ref 3.8–4.9)
ALP SERPL-CCNC: 42 IU/L (ref 44–121)
ALT SERPL-CCNC: 22 IU/L (ref 0–32)
AST SERPL-CCNC: 26 IU/L (ref 0–40)
BASOPHILS # BLD AUTO: 0 X10E3/UL (ref 0–0.2)
BASOPHILS NFR BLD AUTO: 1 %
BILIRUB SERPL-MCNC: 0.9 MG/DL (ref 0–1.2)
BUN SERPL-MCNC: 11 MG/DL (ref 6–24)
BUN/CREAT SERPL: 11 (ref 9–23)
CALCIUM SERPL-MCNC: 9.4 MG/DL (ref 8.7–10.2)
CHLORIDE SERPL-SCNC: 106 MMOL/L (ref 96–106)
CO2 SERPL-SCNC: 24 MMOL/L (ref 20–29)
CREAT SERPL-MCNC: 0.99 MG/DL (ref 0.57–1)
EGFR: 68 ML/MIN/1.73
EOSINOPHIL # BLD AUTO: 0 X10E3/UL (ref 0–0.4)
EOSINOPHIL NFR BLD AUTO: 1 %
ERYTHROCYTE [DISTWIDTH] IN BLOOD BY AUTOMATED COUNT: 12.9 % (ref 11.7–15.4)
GLOBULIN SER-MCNC: 2.2 G/DL (ref 1.5–4.5)
GLUCOSE SERPL-MCNC: 85 MG/DL (ref 70–99)
HCT VFR BLD AUTO: 41.2 % (ref 34–46.6)
HGB BLD-MCNC: 13.4 G/DL (ref 11.1–15.9)
IMM GRANULOCYTES # BLD: 0 X10E3/UL (ref 0–0.1)
IMM GRANULOCYTES NFR BLD: 0 %
LYMPHOCYTES # BLD AUTO: 1.2 X10E3/UL (ref 0.7–3.1)
LYMPHOCYTES NFR BLD AUTO: 38 %
MCH RBC QN AUTO: 31.5 PG (ref 26.6–33)
MCHC RBC AUTO-ENTMCNC: 32.5 G/DL (ref 31.5–35.7)
MCV RBC AUTO: 97 FL (ref 79–97)
MONOCYTES # BLD AUTO: 0.3 X10E3/UL (ref 0.1–0.9)
MONOCYTES NFR BLD AUTO: 8 %
NEUTROPHILS # BLD AUTO: 1.6 X10E3/UL (ref 1.4–7)
NEUTROPHILS NFR BLD AUTO: 52 %
PLATELET # BLD AUTO: 177 X10E3/UL (ref 150–450)
POTASSIUM SERPL-SCNC: 5.2 MMOL/L (ref 3.5–5.2)
PROT SERPL-MCNC: 6.5 G/DL (ref 6–8.5)
RBC # BLD AUTO: 4.26 X10E6/UL (ref 3.77–5.28)
SODIUM SERPL-SCNC: 141 MMOL/L (ref 134–144)
TSH SERPL DL<=0.005 MIU/L-ACNC: 3.62 UIU/ML (ref 0.45–4.5)
WBC # BLD AUTO: 3.1 X10E3/UL (ref 3.4–10.8)

## 2024-10-14 ENCOUNTER — IMMUNIZATIONS (OUTPATIENT)
Dept: FAMILY MEDICINE CLINIC | Facility: CLINIC | Age: 54
End: 2024-10-14
Payer: COMMERCIAL

## 2024-10-14 DIAGNOSIS — Z23 ENCOUNTER FOR IMMUNIZATION: Primary | ICD-10-CM

## 2024-10-14 PROCEDURE — 90673 RIV3 VACCINE NO PRESERV IM: CPT

## 2024-10-14 PROCEDURE — 90471 IMMUNIZATION ADMIN: CPT

## 2024-10-16 ENCOUNTER — OFFICE VISIT (OUTPATIENT)
Dept: PLASTIC SURGERY | Facility: CLINIC | Age: 54
End: 2024-10-16

## 2024-10-16 DIAGNOSIS — Z90.13 S/P MASTECTOMY, BILATERAL: Primary | ICD-10-CM

## 2024-10-16 LAB
DME PARACHUTE DELIVERY DATE ACTUAL: NORMAL
DME PARACHUTE DELIVERY DATE REQUESTED: NORMAL
DME PARACHUTE ITEM DESCRIPTION: NORMAL
DME PARACHUTE ORDER STATUS: NORMAL
DME PARACHUTE SUPPLIER NAME: NORMAL
DME PARACHUTE SUPPLIER PHONE: NORMAL

## 2024-10-16 PROCEDURE — 99024 POSTOP FOLLOW-UP VISIT: CPT | Performed by: PHYSICIAN ASSISTANT

## 2024-10-16 NOTE — PROGRESS NOTES
"Assessment/Plan:     Diagnoses and all orders for this visit:    S/P mastectomy, bilateral  See HPI. She is doing well. Incision has healed nicely.  Left chest upper pole has significantly relaxed but the lower pole is still somewhat tight. She is happy without the implant & has a breast prosthesis. She doesn't want any further surgery at this time & feels when the right implant needs to be \"exchanged\" she will likely just have it removed. We will see her back in 3 months.         Subjective:      Patient ID: Anne Calderón is a 54 y.o. female.    HPI    Pt is here for a post-op visit. She underwent exploration left breast, left breast implant removal & capsulectomy on 7/18 by Dr. Watt. She states she is doing well without complaints. She is happy with the implant removed. She has gotten a breast prosthesis.   Previous history: she had undergone bilateral breast reconstruction (in [place]) & developed a left periprosthetic abscess/infection. Despite multiple interventions, there was no improvement & it was recommended she have the implant removed.    Patient Active Problem List   Diagnosis    Cellulitis    Anxiety and depression    BRCA1 gene mutation positive    Primary insomnia    Menopausal symptoms    S/P mastectomy, bilateral    Blurry vision    Subclinical hypothyroidism     No Known Allergies  Current Outpatient Medications on File Prior to Visit   Medication Sig    ALPRAZolam (XANAX) 0.5 mg tablet Take 0.5 mg by mouth daily at bedtime as needed for anxiety (Patient not taking: Reported on 9/12/2024)    FLUoxetine (PROzac) 20 mg capsule Take 1 capsule (20 mg total) by mouth daily    hydrOXYzine HCL (ATARAX) 25 mg tablet TAKE 1 TABLET BY MOUTH EVERY 6 HOURS AS NEEDED FOR ITCHING.    NON FORMULARY 1 capsule in the morning Lidial/Tibolone-Hormone replacement    norethindrone-ethinyl estradiol (Fyavolv) 0.5-2.5 MG-MCG per tablet Take 1 tablet by mouth daily     No current facility-administered medications on " file prior to visit.     Family History   Problem Relation Age of Onset    Breast cancer Mother              Cancer Father         Lung      Arthritis Sister     Breast cancer Sister         Positive BRCA1  and alive    Hyperlipidemia Sister     Osteoarthritis Sister     Arthritis Brother     Hyperlipidemia Brother     Osteoarthritis Brother      Past Medical History:   Diagnosis Date    Anxiety     Arthritis     Pneumonia     PONV (postoperative nausea and vomiting)     Visual impairment      Social History     Socioeconomic History    Marital status: /Civil Union     Spouse name: Not on file    Number of children: Not on file    Years of education: Not on file    Highest education level: Not on file   Occupational History    Not on file   Tobacco Use    Smoking status: Never    Smokeless tobacco: Never    Tobacco comments:     Sometimes Sigars or Pipe but very seldom   Vaping Use    Vaping status: Never Used   Substance and Sexual Activity    Alcohol use: Yes     Comment: seldom    Drug use: Never    Sexual activity: Yes     Partners: Male     Birth control/protection: Post-menopausal     Comment: Preventive Ovarectomy   Other Topics Concern    Not on file   Social History Narrative    Not on file     Social Determinants of Health     Financial Resource Strain: Not on file   Food Insecurity: Not on file   Transportation Needs: Not on file   Physical Activity: Not on file   Stress: Not on file   Social Connections: Not on file   Intimate Partner Violence: Not on file   Housing Stability: Not on file     Past Surgical History:   Procedure Laterality Date    APPENDECTOMY      BILATERAL OOPHORECTOMY      prophylactic    BREAST IMPLANT  2012    implant exchange    BREAST RECONSTRUCTION Bilateral 2006    in [place]    COLONOSCOPY  2020    COLONOSCOPY  2015    KNEE SURGERY Left 2012    meniscus/ACL    MASTECTOMY Bilateral 2006    prophylactic - in [place]    [place] REMOVAL INTACT BREAST IMPLANT Left  7/18/2024    Procedure: EXPLORATION LEFT BREAST, LEFT BREAST IMPLANT REMOVAL AND CAPSULECTOMY;  Surgeon: Kemal Watt MD;  Location: [facility];  Service: Plastics    REVISION RECONSTRUCTED BREAST Left 03/2024    in [place]         Review of Systems   All other systems reviewed and are negative.        Objective:      LMP  (LMP Unknown)          Physical Exam  Constitutional:       Appearance: Normal appearance. She is well-developed.   HENT:      Head: Normocephalic and atraumatic.   Eyes:      Conjunctiva/sclera: Conjunctivae normal.   Pulmonary:      Effort: Pulmonary effort is normal.      Comments: Left chest upper pole has significantly relaxed but the lower pole is still somewhat tight. Incisions have healed nicely. See photo in media.   Musculoskeletal:         General: Normal range of motion.      Cervical back: Normal range of motion.   Skin:     General: Skin is warm and dry.   Neurological:      Mental Status: She is alert and oriented to person, place, and time.   Psychiatric:         Mood and Affect: Mood normal.         Behavior: Behavior normal.

## 2024-10-17 ENCOUNTER — TELEPHONE (OUTPATIENT)
Age: 54
End: 2024-10-17

## 2024-10-17 DIAGNOSIS — N95.1 MENOPAUSAL SYMPTOMS: Primary | ICD-10-CM

## 2024-10-17 RX ORDER — ACETAMINOPHEN AND CODEINE PHOSPHATE 120; 12 MG/5ML; MG/5ML
1 SOLUTION ORAL DAILY
Qty: 30 TABLET | Refills: 0 | Status: SHIPPED | OUTPATIENT
Start: 2024-10-17 | End: 2024-11-16

## 2024-10-17 RX ORDER — ESTRADIOL 0.05 MG/D
1 PATCH, EXTENDED RELEASE TRANSDERMAL 2 TIMES WEEKLY
Qty: 8 PATCH | Refills: 2 | Status: SHIPPED | OUTPATIENT
Start: 2024-10-17

## 2024-10-25 ENCOUNTER — APPOINTMENT (OUTPATIENT)
Dept: LAB | Age: 54
End: 2024-10-25

## 2024-10-25 DIAGNOSIS — Z00.6 ENCOUNTER FOR EXAMINATION FOR NORMAL COMPARISON OR CONTROL IN CLINICAL RESEARCH PROGRAM: ICD-10-CM

## 2024-10-25 PROCEDURE — 36415 COLL VENOUS BLD VENIPUNCTURE: CPT

## 2024-10-28 DIAGNOSIS — N95.1 MENOPAUSAL SYMPTOMS: ICD-10-CM

## 2024-10-28 NOTE — TELEPHONE ENCOUNTER
Reason for call:   [x] Refill   [] Prior Auth  [] Other:     Office:   [] PCP/Provider -   [x] Specialty/Provider - [facility]  Authorized By: Vivi Jackson MD    Medication:       estradiol (Vivelle-Dot) 0.05 MG/24HR 1 patch, 2 times weekly       norethindrone (Kaylen) 0.35 MG tablet 1 tablet, Daily       Pharmacy: [facility]/pharmacy #[id number] - [place], [place] - [address]      Does the patient have enough for 3 days?   [x] Yes   [] No - Send as HP to POD

## 2024-10-29 ENCOUNTER — CONSULT (OUTPATIENT)
Dept: GASTROENTEROLOGY | Facility: CLINIC | Age: 54
End: 2024-10-29
Payer: COMMERCIAL

## 2024-10-29 VITALS
SYSTOLIC BLOOD PRESSURE: 114 MMHG | BODY MASS INDEX: 19.74 KG/M2 | HEIGHT: 71 IN | DIASTOLIC BLOOD PRESSURE: 72 MMHG | TEMPERATURE: 99.4 F | WEIGHT: 141 LBS

## 2024-10-29 DIAGNOSIS — R14.0 BLOATING: Primary | ICD-10-CM

## 2024-10-29 DIAGNOSIS — Z12.11 COLON CANCER SCREENING: ICD-10-CM

## 2024-10-29 DIAGNOSIS — R11.0 NAUSEA: ICD-10-CM

## 2024-10-29 PROCEDURE — 99203 OFFICE O/P NEW LOW 30 MIN: CPT | Performed by: INTERNAL MEDICINE

## 2024-10-29 RX ORDER — POLYETHYLENE GLYCOL 3350, SODIUM SULFATE ANHYDROUS, SODIUM BICARBONATE, SODIUM CHLORIDE, POTASSIUM CHLORIDE 236; 22.74; 6.74; 5.86; 2.97 G/4L; G/4L; G/4L; G/4L; G/4L
4000 POWDER, FOR SOLUTION ORAL ONCE
Qty: 4000 ML | Refills: 0 | Status: SHIPPED | OUTPATIENT
Start: 2024-10-29 | End: 2024-10-29

## 2024-10-29 RX ORDER — ESTRADIOL 0.05 MG/D
1 PATCH, EXTENDED RELEASE TRANSDERMAL 2 TIMES WEEKLY
Qty: 8 PATCH | Refills: 2 | OUTPATIENT
Start: 2024-10-31

## 2024-10-29 RX ORDER — ACETAMINOPHEN AND CODEINE PHOSPHATE 120; 12 MG/5ML; MG/5ML
1 SOLUTION ORAL DAILY
Qty: 30 TABLET | Refills: 0 | OUTPATIENT
Start: 2024-10-29 | End: 2024-11-28

## 2024-10-29 NOTE — PATIENT INSTRUCTIONS
Scheduled date of EGD/colonoscopy (as of today):01/29/2025  Physician performing EGD/colonoscopy:Jose  Location of EGD/colonoscopy:[place]  Desired bowel prep reviewed with patient:guillermina diaz  Instructions reviewed with patient by:SANDY  Clearances:   NONE    Patient will follow up as needed after procedure

## 2024-10-29 NOTE — PROGRESS NOTES
[facility] - Outpatient Follow-up Note  Anne Calderón 54 y.o. female MRN: [id number]  Encounter: [id number]          ASSESSMENT AND PLAN:    Anne Calderón is a 54 y.o. female with hx of  prior positive FIT tests s/p negative colonoscopy x2 presenting for discussion on CRC screening and bloating.     CRC Screening  - Plan for colonoscopy now  - Golytely/dulcolax prep    Nausea  Bloating  - Suspect dietary etiology, low s/f celiac disease given sx are not present with non-yeast containing bread products that still contain gluten  - Will plan for diagnostic EGD with colonoscopy as above    1. Bloating    2. Colon cancer screening    3. Nausea        Orders Placed This Encounter   Procedures    Colonoscopy    EGD     ______________________________________________________________________    SUBJECTIVE:    Anne Calderón is a 54 y.o. female with hx of prior positive FIT tests s/p negative colonoscopy x2 presenting for recall colonoscopy.    FIT test positive 2015. Colonoscopy at that time normal. Repeat FIT test again positive 2020, last colonoscopy 2020 normal was given 5 year recall. Recently moved from [place] to the , presenting today to establish care and set up for her recall colonoscopy.     Weight stable, no blood in stool, no abdominal pain, no emesis. Endorsing nausea and bloating however with specific bread products that contain yeast, not with bread products without yeast however.     REVIEW OF SYSTEMS IS OTHERWISE NEGATIVE.      Historical Information   Past Medical History:   Diagnosis Date    Anxiety     Arthritis     Pneumonia     PONV (postoperative nausea and vomiting)     Visual impairment      Past Surgical History:   Procedure Laterality Date    APPENDECTOMY      BILATERAL OOPHORECTOMY      prophylactic    BREAST IMPLANT  2012    implant exchange    BREAST RECONSTRUCTION Bilateral 2006    in [place]    COLONOSCOPY  2020    COLONOSCOPY  2015    KNEE SURGERY Left 2012     meniscus/ACL    MASTECTOMY Bilateral 2006    prophylactic - in [place]    [place] REMOVAL INTACT BREAST IMPLANT Left 2024    Procedure: EXPLORATION LEFT BREAST, LEFT BREAST IMPLANT REMOVAL AND CAPSULECTOMY;  Surgeon: Kemal Watt MD;  Location:  MAIN OR;  Service: Plastics    REVISION RECONSTRUCTED BREAST Left 2024    in [place]     Social History   Social History     Substance and Sexual Activity   Alcohol Use Yes    Comment: seldom     Social History     Substance and Sexual Activity   Drug Use Never     Social History     Tobacco Use   Smoking Status Never   Smokeless Tobacco Never   Tobacco Comments    Sometimes Sigars or Pipe but very seldom     Family History   Problem Relation Age of Onset    Breast cancer Mother              Cancer Father         Lung      Arthritis Sister     Breast cancer Sister         Positive BRCA1  and alive    Hyperlipidemia Sister     Osteoarthritis Sister     Arthritis Brother     Hyperlipidemia Brother     Osteoarthritis Brother        Meds/Allergies       Current Outpatient Medications:     estradiol (Vivelle-Dot) 0.05 MG/24HR    FLUoxetine (PROzac) 20 mg capsule    hydrOXYzine HCL (ATARAX) 25 mg tablet    norethindrone (Kaylen) 0.35 MG tablet    ALPRAZolam (XANAX) 0.5 mg tablet    NON FORMULARY    No Known Allergies        Objective     There were no vitals taken for this visit.   There is no height or weight on file to calculate BMI.  Wt Readings from Last 3 Encounters:   24 64.1 kg (141 lb 6.4 oz)   08/15/24 66.7 kg (147 lb)   24 64 kg (141 lb 1.5 oz)        PHYSICAL EXAM:      General Appearance:   Alert, cooperative, no distress   HEENT:   Normocephalic, atraumatic, anicteric.     Neck:  Supple, symmetrical, trachea midline   Lungs:   No respiratory distress    Heart::   Regular rate; no murmur, rub, or gallop.   Abdomen:   Soft, non-tender, non-distended; no masses, no organomegaly    Neurologic:   Normal   Rectal:   Deferred   "  Extremities:  No cyanosis, clubbing or edema    Pulses:  2+ and symmetric    Skin:  No jaundice, rashes, or lesions    Lymph nodes:  No palpable cervical lymphadenopathy        Lab Results:       Lab Units 09/26/24  0857 05/07/24  1543   SODIUM mmol/L 141  --    POTASSIUM mmol/L 5.2  --    CHLORIDE mmol/L 106  --    CO2 mmol/L 24  --    BUN mg/dL 11 15   CREATININE mg/dL 0.99 0.79   GLUCOSE RANDOM mg/dL 85  --             Lab Units 09/26/24  0857   TOTAL PROTEIN g/dL 6.5   ALBUMIN g/dL 4.3   TOTAL BILIRUBIN mg/dL 0.9   AST IU/L 26   ALT IU/L 22           Lab Units 09/26/24  0857   WHITE BLOOD CELL COUNT. x10E3/uL 3.1*   HEMOGLOBIN. g/dL 13.4   HEMATOCRIT. % 41.2   PLATELETS. x10E3/uL 177   MCV. fL 97   MCH. pg 31.5   MCHC. g/dL 32.5   RDW. % 12.9   NEUTROS PCT. % 52   LYMPHS PCT. % 38   MONOS PCT. % 8   EOS PCT. % 1   NEUTROS ABS. x10E3/uL 1.6   LYMPHS ABS. x10E3/uL 1.2   MONOS ABS. x10E3/uL 0.3   EOS ABS. x10E3/uL 0.0   BASOS ABS. x10E3/uL 0.0       No results for input(s): \"IRON\", \"TRANSFERRIN\", \"TRANSFERSAT\", \"FERRITIN\", \"RETIC\" in the last 26054 hours.    No results found for: \"CRP\"    Lab Results   Component Value Date    TSH 3.620 09/26/2024       Radiology Results:   No results found.    Juan Miguel Lynn MD  PGY-6 Gastroenterology Fellow  10/29/2024 2:18 PM    "

## 2024-11-04 DIAGNOSIS — N95.1 MENOPAUSAL SYMPTOMS: ICD-10-CM

## 2024-11-04 RX ORDER — ESTRADIOL 0.05 MG/D
1 PATCH, EXTENDED RELEASE TRANSDERMAL 2 TIMES WEEKLY
Qty: 8 PATCH | Refills: 0 | Status: SHIPPED | OUTPATIENT
Start: 2024-11-04

## 2024-11-04 RX ORDER — ACETAMINOPHEN AND CODEINE PHOSPHATE 120; 12 MG/5ML; MG/5ML
1 SOLUTION ORAL DAILY
Qty: 30 TABLET | Refills: 0 | Status: SHIPPED | OUTPATIENT
Start: 2024-11-04 | End: 2024-12-04

## 2024-11-12 NOTE — PROGRESS NOTES
Subjective:     Anne Calderón is a 54 y.o.  female who presents to discuss her HRT. Anne has known BRCA 1 gene mutation. She therefore had a bilateral mastectomy and oophorectomy at the age of 46. She has had right breast reconstruction but absent left breast (implant removed 2024 due to infection. She was instructed to start HRT given her young age of oophorectomy. She has taken a variety of medications depending on her home country at the time ([place], [place], [place] etc). She was taking Tibolone prescribed from [place]. She is aware that it is not available in the [place] and will be living here for the next 4 years and therefore was hoping to get something prescribed in the [place]. Her biggest symptom was insomnia for which she also takes Atarax PRN. Tibolone has worked best for her though she has tried patches in the past. She was prescribed Fyavolv (NE 0.5mg +EE 2.5mcg) when she established care in 2024 with me but experienced bleeding. She requested transition to alternative method of HRT and was prescribed NE tabs (0.35mg) and Vivelle dot (1 patch 2x weekly @ 0.05mg/24hr). She reports very minimal pink spotting but otherwise denies any further bleeding. She is sleeping well and denies hot flashes or night sweats. She is still taking the Atarax nightly.     Patient Active Problem List   Diagnosis    Cellulitis    Anxiety and depression    BRCA1 gene mutation positive    Primary insomnia    Menopausal symptoms    S/P mastectomy, bilateral    Blurry vision    Subclinical hypothyroidism     Past Medical History:   Diagnosis Date    Anxiety     Arthritis     Pneumonia     PONV (postoperative nausea and vomiting)     Visual impairment      Past Surgical History:   Procedure Laterality Date    APPENDECTOMY      BILATERAL OOPHORECTOMY      prophylactic    BREAST IMPLANT  2012    implant exchange    BREAST RECONSTRUCTION Bilateral 2006    in [place]    COLONOSCOPY      COLONOSCOPY      KNEE SURGERY Left      "meniscus/ACL    MASTECTOMY Bilateral 2006    prophylactic - in [place]    [place] REMOVAL INTACT BREAST IMPLANT Left 7/18/2024    Procedure: EXPLORATION LEFT BREAST, LEFT BREAST IMPLANT REMOVAL AND CAPSULECTOMY;  Surgeon: Kemal Watt MD;  Location:  MAIN OR;  Service: Plastics    REVISION RECONSTRUCTED BREAST Left 03/2024    in [place]       Objective:    Vitals: Height 5' 11\" (1.803 m), weight 66.7 kg (147 lb).Body mass index is 20.5 kg/m².    Physical Exam  Vitals reviewed.   Constitutional:       General: She is not in acute distress.     Appearance: Normal appearance. She is well-developed. She is not ill-appearing, toxic-appearing or diaphoretic.   Cardiovascular:      Rate and Rhythm: Normal rate.   Pulmonary:      Effort: Pulmonary effort is normal. No respiratory distress.   Skin:     General: Skin is warm and dry.   Neurological:      Mental Status: She is alert and oriented to person, place, and time.   Psychiatric:         Mood and Affect: Mood normal.         Behavior: Behavior normal.         Assessment/Plan:    Assessment & Plan  Menopausal symptoms  Continue Vivelle dot (1 patch 2x weekly @ 0.05mg/24hr) - refill given  Continue NE tabs (0.35mg daily) - refill given  We reviewed that given her age, she may consider discontinuing her HRT but if she is tolerating well without side effects, it is also safe to continue at this time. We will continue to monitor and rediscuss at her next appointment.   She may continue to take the Atarax nightly or she may trial without to see if the HRT is enough to help with her sleep. It is safe for her to continue Atarax given that she does not have side effects or hypersomnolence.   RTO for APE and HRT check in 3 months.     Orders:    estradiol (Vivelle-Dot) 0.05 MG/24HR; Place 1 patch on the skin 2 (two) times a week    norethindrone (Kaylen) 0.35 MG tablet; Take 1 tablet (0.35 mg total) by mouth daily        Emani Coates, " MD  OB/GYN  She/her  11/18/2024  10:43 AM

## 2024-11-18 ENCOUNTER — OFFICE VISIT (OUTPATIENT)
Dept: OBGYN CLINIC | Facility: CLINIC | Age: 54
End: 2024-11-18
Payer: COMMERCIAL

## 2024-11-18 VITALS
HEIGHT: 71 IN | SYSTOLIC BLOOD PRESSURE: 98 MMHG | BODY MASS INDEX: 20.58 KG/M2 | DIASTOLIC BLOOD PRESSURE: 62 MMHG | WEIGHT: 147 LBS

## 2024-11-18 DIAGNOSIS — N95.1 MENOPAUSAL SYMPTOMS: ICD-10-CM

## 2024-11-18 PROCEDURE — 99213 OFFICE O/P EST LOW 20 MIN: CPT | Performed by: OBSTETRICS & GYNECOLOGY

## 2024-11-18 RX ORDER — ACETAMINOPHEN AND CODEINE PHOSPHATE 120; 12 MG/5ML; MG/5ML
1 SOLUTION ORAL DAILY
Qty: 30 TABLET | Refills: 2 | Status: SHIPPED | OUTPATIENT
Start: 2024-11-18 | End: 2025-02-16

## 2024-11-18 RX ORDER — ESTRADIOL 0.05 MG/D
1 PATCH, EXTENDED RELEASE TRANSDERMAL 2 TIMES WEEKLY
Qty: 8 PATCH | Refills: 0 | Status: SHIPPED | OUTPATIENT
Start: 2024-11-18

## 2024-11-18 NOTE — ASSESSMENT & PLAN NOTE
Continue Vivelle dot (1 patch 2x weekly @ 0.05mg/24hr) - refill given  Continue NE tabs (0.35mg daily) - refill given  We reviewed that given her age, she may consider discontinuing her HRT but if she is tolerating well without side effects, it is also safe to continue at this time. We will continue to monitor and rediscuss at her next appointment.   She may continue to take the Atarax nightly or she may trial without to see if the HRT is enough to help with her sleep. It is safe for her to continue Atarax given that she does not have side effects or hypersomnolence.   RTO for APE and HRT check in 3 months.     Orders:    estradiol (Vivelle-Dot) 0.05 MG/24HR; Place 1 patch on the skin 2 (two) times a week    norethindrone (Kaylen) 0.35 MG tablet; Take 1 tablet (0.35 mg total) by mouth daily

## 2024-12-18 DIAGNOSIS — N95.1 MENOPAUSAL SYMPTOMS: ICD-10-CM

## 2024-12-19 DIAGNOSIS — F41.9 ANXIETY AND DEPRESSION: ICD-10-CM

## 2024-12-19 DIAGNOSIS — F32.A ANXIETY AND DEPRESSION: ICD-10-CM

## 2024-12-19 RX ORDER — ESTRADIOL 0.05 MG/D
1 PATCH, EXTENDED RELEASE TRANSDERMAL 2 TIMES WEEKLY
Qty: 8 PATCH | Refills: 5 | Status: SHIPPED | OUTPATIENT
Start: 2024-12-19

## 2024-12-19 RX ORDER — ACETAMINOPHEN AND CODEINE PHOSPHATE 120; 12 MG/5ML; MG/5ML
1 SOLUTION ORAL DAILY
Qty: 30 TABLET | Refills: 1 | Status: SHIPPED | OUTPATIENT
Start: 2024-12-19 | End: 2025-03-19

## 2024-12-23 ENCOUNTER — TELEPHONE (OUTPATIENT)
Dept: SURGICAL ONCOLOGY | Facility: CLINIC | Age: 54
End: 2024-12-23

## 2024-12-23 NOTE — TELEPHONE ENCOUNTER
Called patient and left message for more information of antibiotic request and if patient is having any issues  or not at this time and to contact their PCP if there are any issues.

## 2025-01-16 ENCOUNTER — RESULTS FOLLOW-UP (OUTPATIENT)
Dept: LAB | Facility: HOSPITAL | Age: 55
End: 2025-01-16

## 2025-01-16 LAB
APOB+LDLR+PCSK9 GENE MUT ANL BLD/T: NOT DETECTED
BRCA1+BRCA2 DEL+DUP + FULL MUT ANL BLD/T: ABNORMAL
GENE DIS ANL INTERP-IMP: POSITIVE
MLH1+MSH2+MSH6+PMS2 GN DEL+DUP+FUL M: NOT DETECTED

## 2025-01-16 NOTE — TELEPHONE ENCOUNTER
1/16/2025: Left a voicemail for Anne regarding results from the [employer] research study. First Attempt.

## 2025-01-17 ENCOUNTER — OFFICE VISIT (OUTPATIENT)
Dept: PLASTIC SURGERY | Facility: CLINIC | Age: 55
End: 2025-01-17
Payer: COMMERCIAL

## 2025-01-17 ENCOUNTER — TELEPHONE (OUTPATIENT)
Age: 55
End: 2025-01-17

## 2025-01-17 DIAGNOSIS — Z90.13 S/P MASTECTOMY, BILATERAL: Primary | ICD-10-CM

## 2025-01-17 PROCEDURE — 99213 OFFICE O/P EST LOW 20 MIN: CPT | Performed by: PHYSICIAN ASSISTANT

## 2025-01-17 NOTE — PROGRESS NOTES
"Assessment/Plan:     Diagnoses and all orders for this visit:    S/P mastectomy, bilateral  See HPI. She is doing well. Incision has healed nicely.  Left chest upper pole has significantly relaxed but the lower pole is still somewhat tight, however more relaxed then previous. She is happy without the implant & has a breast prosthesis. She doesn't want any further surgery at this time & feels when the right implant needs to be \"exchanged\" she will likely just have it removed. We will see her back in 6 months.           Subjective:      Patient ID: Anne Calderón is a 54 y.o. female.    HPI    Pt is here for a post-op visit. She underwent exploration left breast, left breast implant removal & capsulectomy on 7/18 by Dr. Watt. She states she is doing well without complaints. She is happy with the implant removed. She has gotten a breast prosthesis.   Previous history: she had undergone bilateral breast reconstruction (in [place]) & developed a left periprosthetic abscess/infection. Despite multiple interventions, there was no improvement & it was recommended she have the implant removed.    Patient Active Problem List   Diagnosis    Cellulitis    Anxiety and depression    BRCA1 gene mutation positive    Primary insomnia    Menopausal symptoms    S/P mastectomy, bilateral    Blurry vision    Subclinical hypothyroidism     No Known Allergies  Current Outpatient Medications on File Prior to Visit   Medication Sig    ALPRAZolam (XANAX) 0.5 mg tablet Take 0.5 mg by mouth daily at bedtime as needed for anxiety    estradiol (Vivelle-Dot) 0.05 MG/24HR Place 1 patch on the skin 2 (two) times a week    FLUoxetine (PROzac) 20 mg capsule TAKE 1 CAPSULE BY MOUTH EVERY DAY    hydrOXYzine HCL (ATARAX) 25 mg tablet TAKE 1 TABLET BY MOUTH EVERY 6 HOURS AS NEEDED FOR ITCHING.    NON FORMULARY 1 capsule in the morning Lidial/Tibolone-Hormone replacement    norethindrone (Kaylen) 0.35 MG tablet Take 1 tablet (0.35 mg total) by mouth " daily    polyethylene glycol (Golytely) 4000 mL solution Take 4,000 mL by mouth once for 1 dose Take 4000 mL by mouth once for 1 dose. Use as directed     No current facility-administered medications on file prior to visit.     Family History   Problem Relation Age of Onset    Breast cancer Mother              Cancer Father         Lung      Arthritis Sister     Breast cancer Sister         Positive BRCA1  and alive    Hyperlipidemia Sister     Osteoarthritis Sister     Arthritis Brother     Hyperlipidemia Brother     Osteoarthritis Brother      Past Medical History:   Diagnosis Date    Anxiety     Arthritis     Pneumonia     PONV (postoperative nausea and vomiting)     Visual impairment      Social History     Socioeconomic History    Marital status: /Civil Union     Spouse name: Not on file    Number of children: Not on file    Years of education: Not on file    Highest education level: Not on file   Occupational History    Not on file   Tobacco Use    Smoking status: Never    Smokeless tobacco: Never    Tobacco comments:     Sometimes Sigars or Pipe but very seldom   Vaping Use    Vaping status: Never Used   Substance and Sexual Activity    Alcohol use: Yes     Comment: seldom    Drug use: Never    Sexual activity: Yes     Partners: Male     Birth control/protection: Post-menopausal   Other Topics Concern    Not on file   Social History Narrative    Not on file     Social Drivers of Health     Financial Resource Strain: Not on file   Food Insecurity: Not on file   Transportation Needs: Not on file   Physical Activity: Not on file   Stress: Not on file   Social Connections: Not on file   Intimate Partner Violence: Not At Risk (2024)    Received from [facility]    Intimate Partner Violence     Are you denied basic needs such as food, clothing, or medical care?: No     In the past 12 months have you been in a relationship with a person who hurts, threatens, or tries to  control you?: No     Are you denied basic needs such as food, clothing, or medical care?: No     In the past 12 months have you been in a relationship with a person who hurts, threatens, or tries to control you?: No   Housing Stability: Not on file     Past Surgical History:   Procedure Laterality Date    APPENDECTOMY      BILATERAL OOPHORECTOMY      prophylactic    BREAST IMPLANT  2012    implant exchange    BREAST RECONSTRUCTION Bilateral 2006    in [place]    COLONOSCOPY  2020    COLONOSCOPY  2015    KNEE SURGERY Left 2012    meniscus/ACL    MASTECTOMY Bilateral 2006    prophylactic - in [place]    [place] REMOVAL INTACT BREAST IMPLANT Left 7/18/2024    Procedure: EXPLORATION LEFT BREAST, LEFT BREAST IMPLANT REMOVAL AND CAPSULECTOMY;  Surgeon: Kemal Watt MD;  Location:  MAIN OR;  Service: Plastics    REVISION RECONSTRUCTED BREAST Left 03/2024    in [place]         Review of Systems   All other systems reviewed and are negative.        Objective:      LMP  (LMP Unknown)          Physical Exam  Constitutional:       Appearance: Normal appearance. She is well-developed.   HENT:      Head: Normocephalic and atraumatic.   Eyes:      Conjunctiva/sclera: Conjunctivae normal.   Pulmonary:      Effort: Pulmonary effort is normal.      Comments: Left chest incision is well healed, left chest tissue continues to relax, see picture in media  Musculoskeletal:         General: Normal range of motion.      Cervical back: Normal range of motion.   Skin:     General: Skin is warm and dry.   Neurological:      Mental Status: She is alert and oriented to person, place, and time.   Psychiatric:         Mood and Affect: Mood normal.         Behavior: Behavior normal.

## 2025-01-17 NOTE — TELEPHONE ENCOUNTER
Caller: Anne Calderón    Doctor: Amina / Alina    Reason for call: Anne has an infection on her great toe left foot.  Can she be forced on?    Call back#: [phone]

## 2025-01-21 ENCOUNTER — TELEPHONE (OUTPATIENT)
Dept: OTHER | Facility: HOSPITAL | Age: 55
End: 2025-01-21

## 2025-01-21 ENCOUNTER — TELEPHONE (OUTPATIENT)
Dept: GENETICS | Facility: CLINIC | Age: 55
End: 2025-01-21

## 2025-01-21 ENCOUNTER — OFFICE VISIT (OUTPATIENT)
Dept: PODIATRY | Facility: CLINIC | Age: 55
End: 2025-01-21
Payer: COMMERCIAL

## 2025-01-21 VITALS — WEIGHT: 146 LBS | HEIGHT: 71 IN | BODY MASS INDEX: 20.44 KG/M2

## 2025-01-21 DIAGNOSIS — R89.8 ABNORMAL GENETIC TEST: Primary | ICD-10-CM

## 2025-01-21 DIAGNOSIS — B35.1 ONYCHOMYCOSIS: Primary | ICD-10-CM

## 2025-01-21 DIAGNOSIS — L60.3 ONYCHODYSTROPHY: ICD-10-CM

## 2025-01-21 PROCEDURE — 99203 OFFICE O/P NEW LOW 30 MIN: CPT | Performed by: PODIATRIST

## 2025-01-21 RX ORDER — FLUCONAZOLE 150 MG/1
150 TABLET ORAL WEEKLY
Qty: 26 TABLET | Refills: 0 | Status: SHIPPED | OUTPATIENT
Start: 2025-01-21 | End: 2025-07-16

## 2025-01-21 NOTE — TELEPHONE ENCOUNTER
I spoke with Anne regarding a referral we received for genetic counseling to discuss her helix test results. Anne informed me that she has been aware of her mutation since 2006 when she had gotten testing. She has had preventative surgeries and her children (who live in [place] have been tested and do carry the mutation as well. Anne had expressed that she would be willing to do a consult with us if it helped the research study, and I explained that it does not affect the research. I stated that it was a resource for her and her family. Anne will not proceed with testing at this time but is aware that if she changes her mind at any time, she can call us to schedule at [phone].

## 2025-01-21 NOTE — TELEPHONE ENCOUNTER
Hi Dr. Khan, your patient tested positive for the BRCA1 gene through [employer]. It appears she is aware of this result and has been following with appropriate management. This is just an FYI

## 2025-01-21 NOTE — PROGRESS NOTES
Name: Anne Calderón      : 1970      MRN: [id number]  Encounter Provider: Terry Purvis DPM  Encounter Date: 2025   Encounter department: [facility]  :  Assessment & Plan  Onychomycosis  Discussed treatment options with patient in detail with regards to chronic nail mycosis.  Treatment options such as oral and topical antifungal therapies were reviewed.  Relative success rates also reviewed.  Due to patient having failed Lamisil and topical therapies would recommend changing to a different oral antifungal from a different category of medication.  Recommend she try a course of fluconazole which is chemically significantly different than terbinafine.  LFTs reviewed from [facility] 2024 are noted to be normal   Rx fluconazole 150 mg tablet once a week for approximately 26 weeks  Follow-up in 3 months.  Orders:  •  fluconazole (DIFLUCAN) 150 mg tablet; Take 1 tablet (150 mg total) by mouth once a week for 26 doses    Onychodystrophy             History of Present Illness   HPI  Anne Calderón is a 55 y.o. female who presents thick yellow dystrophic left great toenail.  Patient reports this all started in  and has tried topical antifungal and 2 courses of Lamisil tablets.  Still had recurrence and then went on to try a laser treatment which did not seem to help as much.  Patient requests another course of oral antifungal therapy as she had some success but it never completely resolved.  History obtained from: patient    Review of Systems   Constitutional: Negative.    HENT: Negative.     Eyes: Negative.    Respiratory: Negative.     Cardiovascular: Negative.    Gastrointestinal: Negative.    Endocrine: Negative.    Genitourinary: Negative.    Musculoskeletal: Negative.    Skin:         Thick yellow brittle tender nail left great toe   Allergic/Immunologic: Negative.    Neurological: Negative.    Hematological: Negative.    Psychiatric/Behavioral: Negative.       Past Medical History    Past Medical History:   Diagnosis Date   • Anxiety    • Arthritis    • BRCA positive     Brca1   • Ingrown toenail 2025   • Onychomycosis    • Pneumonia    • PONV (postoperative nausea and vomiting)    • Postoperative wound infection 2024    I have noticed redness which became worse   • Visual impairment      Past Surgical History:   Procedure Laterality Date   • APPENDECTOMY     • BILATERAL OOPHORECTOMY      prophylactic   • BREAST IMPLANT  2012    implant exchange   • BREAST RECONSTRUCTION Bilateral 2006    in [place]   • COLONOSCOPY     • COLONOSCOPY     • HYSTERECTOMY  2016    Ovarectomy preventive to cancer due to BRCA1   • KNEE SURGERY Left 2012    meniscus/ACL   • MASTECTOMY Bilateral 2006    prophylactic - in [place]   • [place] REMOVAL INTACT BREAST IMPLANT Left 2024    Procedure: EXPLORATION LEFT BREAST, LEFT BREAST IMPLANT REMOVAL AND CAPSULECTOMY;  Surgeon: Kemal Watt MD;  Location:  MAIN OR;  Service: Plastics   • REVISION RECONSTRUCTED BREAST Left 2024    in [place]     Family History   Problem Relation Age of Onset   • Breast cancer Mother             • Cancer Father         Lung     • Arthritis Sister    • Breast cancer Sister         Positive BRCA1  and alive   • Hyperlipidemia Sister    • Osteoarthritis Sister    • Arthritis Brother    • Hyperlipidemia Brother    • Osteoarthritis Brother       reports that she has never smoked. She has never used smokeless tobacco. She reports current alcohol use. She reports that she does not use drugs.  Current Outpatient Medications   Medication Instructions   • ALPRAZolam (XANAX) 0.5 mg, Daily at bedtime PRN   • estradiol (Vivelle-Dot) 0.05 MG/24HR 1 patch, Transdermal, 2 times weekly   • fluconazole (DIFLUCAN) 150 mg, Oral, Weekly   • FLUoxetine (PROZAC) 20 mg, Oral, Daily   • hydrOXYzine HCL (ATARAX) 25 mg, Oral, Every 6 hours PRN   • methylPREDNISolone 4 MG tablet therapy pack TAKE 6 TABLETS ON DAY 1 AS  "DIRECTED ON PACKAGE AND DECREASE BY 1 TAB EACH DAY FOR A TOTAL OF 6 DAYS   • norethindrone (JENCYCLA) 0.35 mg, Oral, Daily   No Known Allergies      Objective   Ht 5' 11\" (1.803 m)   Wt 66.2 kg (146 lb)   LMP  (LMP Unknown)   BMI 20.36 kg/m²      Physical Exam  Vitals and nursing note reviewed.   Constitutional:       General: She is not in acute distress.     Appearance: Normal appearance. She is well-developed.   HENT:      Head: Normocephalic and atraumatic.      Nose: Nose normal.   Eyes:      Conjunctiva/sclera: Conjunctivae normal.   Cardiovascular:      Rate and Rhythm: Normal rate and regular rhythm.   Pulmonary:      Effort: Pulmonary effort is normal. No respiratory distress.   Musculoskeletal:         General: No swelling.      Cervical back: Neck supple.   Feet:      Right foot:      Protective Sensation: 10 sites tested.  10 sites sensed.      Toenail Condition: Right toenails are abnormally thick. Fungal disease present.     Left foot:      Protective Sensation: 10 sites tested.  10 sites sensed.      Toenail Condition: Left toenails are abnormally thick. Fungal disease present.  Skin:     General: Skin is warm and dry.      Capillary Refill: Capillary refill takes 2 to 3 seconds.      Comments: Left great toe: Severely dystrophic with lateral 50-60% involvement including distal nailbed, yellow discoloration, subungual debris, 0.4 cm average thickness, and fungal odor noted.  Some distal onycholysis noted.    Right great toe: Distal 35/40% yellow discoloration, subungual debris, onycholysis, and fungal odor noted.  0.4 cm thickness     Neurological:      General: No focal deficit present.      Mental Status: She is alert.   Psychiatric:         Mood and Affect: Mood normal.           "

## 2025-01-29 ENCOUNTER — HOSPITAL ENCOUNTER (OUTPATIENT)
Dept: GASTROENTEROLOGY | Facility: HOSPITAL | Age: 55
Setting detail: OUTPATIENT SURGERY
Discharge: HOME | End: 2025-01-29
Payer: COMMERCIAL

## 2025-01-29 ENCOUNTER — ANESTHESIA EVENT (OUTPATIENT)
Dept: GASTROENTEROLOGY | Facility: HOSPITAL | Age: 55
End: 2025-01-29
Payer: COMMERCIAL

## 2025-01-29 ENCOUNTER — ANESTHESIA (OUTPATIENT)
Dept: GASTROENTEROLOGY | Facility: HOSPITAL | Age: 55
End: 2025-01-29
Payer: COMMERCIAL

## 2025-01-29 VITALS
SYSTOLIC BLOOD PRESSURE: 102 MMHG | HEART RATE: 59 BPM | RESPIRATION RATE: 18 BRPM | DIASTOLIC BLOOD PRESSURE: 60 MMHG | TEMPERATURE: 97.4 F | OXYGEN SATURATION: 96 % | BODY MASS INDEX: 18.51 KG/M2 | WEIGHT: 136.69 LBS | HEIGHT: 72 IN

## 2025-01-29 DIAGNOSIS — R11.0 NAUSEA: ICD-10-CM

## 2025-01-29 DIAGNOSIS — Z12.11 COLON CANCER SCREENING: ICD-10-CM

## 2025-01-29 DIAGNOSIS — R14.0 BLOATING: ICD-10-CM

## 2025-01-29 PROCEDURE — 88341 IMHCHEM/IMCYTCHM EA ADD ANTB: CPT | Performed by: PATHOLOGY

## 2025-01-29 PROCEDURE — 88305 TISSUE EXAM BY PATHOLOGIST: CPT | Performed by: PATHOLOGY

## 2025-01-29 PROCEDURE — 45385 COLONOSCOPY W/LESION REMOVAL: CPT | Performed by: INTERNAL MEDICINE

## 2025-01-29 PROCEDURE — 88342 IMHCHEM/IMCYTCHM 1ST ANTB: CPT | Performed by: PATHOLOGY

## 2025-01-29 PROCEDURE — 43239 EGD BIOPSY SINGLE/MULTIPLE: CPT | Performed by: INTERNAL MEDICINE

## 2025-01-29 RX ORDER — LIDOCAINE HCL/PF 100 MG/5ML
SYRINGE (ML) INJECTION AS NEEDED
Status: DISCONTINUED | OUTPATIENT
Start: 2025-01-29 | End: 2025-01-29

## 2025-01-29 RX ORDER — PROPOFOL 10 MG/ML
INJECTION, EMULSION INTRAVENOUS CONTINUOUS PRN
Status: DISCONTINUED | OUTPATIENT
Start: 2025-01-29 | End: 2025-01-29

## 2025-01-29 RX ORDER — PROPOFOL 10 MG/ML
INJECTION, EMULSION INTRAVENOUS AS NEEDED
Status: DISCONTINUED | OUTPATIENT
Start: 2025-01-29 | End: 2025-01-29

## 2025-01-29 RX ORDER — SODIUM CHLORIDE 9 MG/ML
INJECTION, SOLUTION INTRAVENOUS CONTINUOUS PRN
Status: DISCONTINUED | OUTPATIENT
Start: 2025-01-29 | End: 2025-01-29

## 2025-01-29 RX ADMIN — LIDOCAINE HYDROCHLORIDE 100 MG: 20 INJECTION INTRAVENOUS at 12:26

## 2025-01-29 RX ADMIN — PROPOFOL 100 MG: 10 INJECTION, EMULSION INTRAVENOUS at 12:26

## 2025-01-29 RX ADMIN — SODIUM CHLORIDE: 0.9 INJECTION, SOLUTION INTRAVENOUS at 12:24

## 2025-01-29 RX ADMIN — PROPOFOL 120 MCG/KG/MIN: 10 INJECTION, EMULSION INTRAVENOUS at 12:28

## 2025-01-29 NOTE — ANESTHESIA PREPROCEDURE EVALUATION
Procedure:  COLONOSCOPY  EGD    Relevant Problems   ANESTHESIA (within normal limits)      CARDIO (within normal limits)      ENDO   (+) Subclinical hypothyroidism      GI/HEPATIC (within normal limits)      /RENAL (within normal limits)      HEMATOLOGY (within normal limits)      MUSCULOSKELETAL (within normal limits)      NEURO/PSYCH   (+) Anxiety and depression      PULMONARY (within normal limits)        Physical Exam    Airway    Mallampati score: III  TM Distance: >3 FB  Neck ROM: full     Dental   No notable dental hx     Cardiovascular  Rhythm: regular, Rate: normal, Cardiovascular exam normal    Pulmonary  Pulmonary exam normal Breath sounds clear to auscultation    Other Findings  post-pubertal.      Anesthesia Plan  ASA Score- 2     Anesthesia Type- IV sedation with anesthesia with ASA Monitors.         Additional Monitors:     Airway Plan:            Plan Factors-Exercise tolerance (METS): >4 METS.    Chart reviewed. EKG reviewed. Imaging results reviewed. Existing labs reviewed. Patient summary reviewed.    Patient is not a current smoker.  Patient instructed to abstain from smoking on day of procedure. Patient did not smoke on day of surgery.    Obstructive sleep apnea risk education given perioperatively.        Induction- intravenous.    Postoperative Plan-     Perioperative Resuscitation Plan - Level 1 - Full Code.       Informed Consent- Anesthetic plan and risks discussed with patient.  I personally reviewed this patient with the CRNA. Discussed and agreed on the Anesthesia Plan with the CRNA..      NPO Status:  Vitals Value Taken Time   Date of last liquid 01/29/25 01/29/25 1140   Time of last liquid 0700 01/29/25 1140   Date of last solid 01/27/25 01/29/25 1140   Time of last solid 1900 01/29/25 1140

## 2025-01-29 NOTE — H&P
History and Physical -  [facility]  Anne Calderón 54 y.o. female MRN: [id number]                  HPI: Anne Calderón is a 54 y.o. year old female who presents for EGD/colonoscopy for nausea, bloating and CRC screen. Positive FIT followed by colonoscopy in 2020, no polyps.       REVIEW OF SYSTEMS: Per the HPI, and otherwise unremarkable.    Historical Information   Past Medical History:   Diagnosis Date    Anxiety     Arthritis     Ingrown toenail 2025    Onychomycosis     Pneumonia     PONV (postoperative nausea and vomiting)     Visual impairment      Past Surgical History:   Procedure Laterality Date    APPENDECTOMY      BILATERAL OOPHORECTOMY      prophylactic    BREAST IMPLANT  2012    implant exchange    BREAST RECONSTRUCTION Bilateral 2006    in [place]    COLONOSCOPY      COLONOSCOPY  2015    HYSTERECTOMY  2016    Ovarectomy preventive to cancer due to BRCA1    KNEE SURGERY Left 2012    meniscus/ACL    MASTECTOMY Bilateral 2006    prophylactic - in [place]    [place] REMOVAL INTACT BREAST IMPLANT Left 2024    Procedure: EXPLORATION LEFT BREAST, LEFT BREAST IMPLANT REMOVAL AND CAPSULECTOMY;  Surgeon: Kemal Watt MD;  Location:  MAIN OR;  Service: Plastics    REVISION RECONSTRUCTED BREAST Left 2024    in [place]     Social History   Social History     Substance and Sexual Activity   Alcohol Use Yes    Comment: Very seldom     Social History     Substance and Sexual Activity   Drug Use Never     Social History     Tobacco Use   Smoking Status Never   Smokeless Tobacco Never   Tobacco Comments    Sometimes Sigars or Pipe but very seldom     Family History   Problem Relation Age of Onset    Breast cancer Mother              Cancer Father         Lung      Arthritis Sister     Breast cancer Sister         Positive BRCA1  and alive    Hyperlipidemia Sister     Osteoarthritis Sister     Arthritis Brother     Hyperlipidemia Brother     Osteoarthritis Brother   "      Meds/Allergies       Current Outpatient Medications:     estradiol (Vivelle-Dot) 0.05 MG/24HR    fluconazole (DIFLUCAN) 150 mg tablet    FLUoxetine (PROzac) 20 mg capsule    ALPRAZolam (XANAX) 0.5 mg tablet    hydrOXYzine HCL (ATARAX) 25 mg tablet    NON FORMULARY    norethindrone (Kaylen) 0.35 MG tablet    No Known Allergies    Objective     /63   Pulse 56   Temp 97.6 °F (36.4 °C) (Tympanic)   Resp 18   Ht 5' 11.65\" (1.82 m)   Wt 62 kg (136 lb 11 oz)   LMP  (LMP Unknown)   SpO2 98%   BMI 18.72 kg/m²       PHYSICAL EXAM    Gen: NAD  Head: NCAT  CV: RRR  CHEST: Clear  ABD: soft, NT/ND  EXT: no edema      ASSESSMENT/PLAN:  This is a 54 y.o. year old female here for EGD/colonoscopy, and she is stable and optimized for her procedure.        "

## 2025-01-29 NOTE — ANESTHESIA POSTPROCEDURE EVALUATION
Post-Op Assessment Note    CV Status:  Stable  Pain Score: 0    Pain management: adequate       Mental Status:  Sleepy   Hydration Status:  Euvolemic   PONV Controlled:  Controlled   Airway Patency:  Patent     Post Op Vitals Reviewed: Yes    No anethesia notable event occurred.    Staff: Anesthesiologist, CRNA           Last Filed PACU Vitals:  Vitals Value Taken Time   Temp 97.4 °F (36.3 °C) 01/29/25 1317   Pulse 53 01/29/25 1317   /62 01/29/25 1317   Resp 18 01/29/25 1317   SpO2 100 % 01/29/25 1317       Modified Lynda:     Vitals Value Taken Time   Activity 2 01/29/25 1318   Respiration 2 01/29/25 1318   Circulation 2 01/29/25 1318   Consciousness 1 01/29/25 1318   Oxygen Saturation 2 01/29/25 1318     Modified Lynda Score: 9

## 2025-01-31 ENCOUNTER — TELEPHONE (OUTPATIENT)
Age: 55
End: 2025-01-31

## 2025-01-31 ENCOUNTER — RESULTS FOLLOW-UP (OUTPATIENT)
Dept: GASTROENTEROLOGY | Facility: CLINIC | Age: 55
End: 2025-01-31

## 2025-02-04 DIAGNOSIS — N95.1 MENOPAUSAL SYMPTOMS: ICD-10-CM

## 2025-02-04 RX ORDER — NORETHINDRONE 0.35 MG/1
1 TABLET ORAL DAILY
Qty: 28 TABLET | Refills: 5 | Status: SHIPPED | OUTPATIENT
Start: 2025-02-04

## 2025-03-21 ENCOUNTER — TELEPHONE (OUTPATIENT)
Dept: OBGYN CLINIC | Facility: OTHER | Age: 55
End: 2025-03-21

## 2025-03-21 ENCOUNTER — OFFICE VISIT (OUTPATIENT)
Dept: OBGYN CLINIC | Facility: CLINIC | Age: 55
End: 2025-03-21
Payer: COMMERCIAL

## 2025-03-21 VITALS
BODY MASS INDEX: 20.3 KG/M2 | HEIGHT: 71 IN | SYSTOLIC BLOOD PRESSURE: 104 MMHG | DIASTOLIC BLOOD PRESSURE: 62 MMHG | WEIGHT: 145 LBS

## 2025-03-21 DIAGNOSIS — N39.3 STRESS INCONTINENCE: ICD-10-CM

## 2025-03-21 DIAGNOSIS — N95.1 MENOPAUSAL SYMPTOMS: ICD-10-CM

## 2025-03-21 DIAGNOSIS — Z01.419 ENCOUNTER FOR ROUTINE GYNECOLOGICAL EXAMINATION WITH PAPANICOLAOU SMEAR OF CERVIX: Primary | ICD-10-CM

## 2025-03-21 PROCEDURE — 99396 PREV VISIT EST AGE 40-64: CPT | Performed by: OBSTETRICS & GYNECOLOGY

## 2025-03-21 RX ORDER — ACETAMINOPHEN AND CODEINE PHOSPHATE 120; 12 MG/5ML; MG/5ML
1 SOLUTION ORAL DAILY
Qty: 84 TABLET | Refills: 4 | Status: SHIPPED | OUTPATIENT
Start: 2025-03-21

## 2025-03-21 RX ORDER — ESTRADIOL 0.05 MG/D
1 PATCH, EXTENDED RELEASE TRANSDERMAL 2 TIMES WEEKLY
Qty: 24 PATCH | Refills: 4 | Status: SHIPPED | OUTPATIENT
Start: 2025-03-24

## 2025-03-21 NOTE — PROGRESS NOTES
Subjective      Anne Calderón is a 55 y.o. female who presents for annual GYN exam.    GYN:  She denies any vaginal bleeding. She is s/p oophorectomy at 45yo.  She denies vaginal discharge, labial erythema or lesions, dyspareunia.  Contraception: N/A.  Patient is sexually active with her . She denies issues with intimacy. She has been with her  for 25 years.     HRT:   Anne has known BRCA 1 gene mutation. She therefore had a bilateral mastectomy and oophorectomy at the age of 46. She has had right breast reconstruction but absent left breast (implant removed 2024 due to infection. She was instructed to start HRT given her young age of oophorectomy. She has taken a variety of medications depending on her home country at the time ([place], [place], [place] etc). She was taking Tibolone prescribed from [place]. She is aware that it is not available in the [place] and will be living here for the next 4 years and therefore was hoping to get something prescribed in the [place]. Her biggest symptom was insomnia for which she also takes Atarax PRN. Tibolone has worked best for her though she has tried patches in the past. She was prescribed Fyavolv (NE 0.5mg +EE 2.5mcg) when she established care in 2024 with me but experienced bleeding. She requested transition to alternative method of HRT and was prescribed NE tabs (0.35mg) and Vivelle dot (1 patch 2x weekly @ 0.05mg/24hr) which she has been tolerating well without side effects.     OB:  OB History    Para Term  AB Living   2 2    2   SAB IAB Ectopic Multiple Live Births       2      # Outcome Date GA Lbr Nii/2nd Weight Sex Type Anes PTL Lv   2 Para     F Vag-Spont   ESPERANZA   1 Para     M Vag-Spont   ESPERANZA      Obstetric Comments   Menarche age 14   First child age 21   Menopause age 46 (surgical)   + use of HRT     :  She denies dysuria, urinary frequency or urgency.  She denies hematuria, flank pain.  She is worried that she is becoming incontinent. She  states that if her bladder gets too full, for example, when she goes to stand up out of bed in the morning, she needs to urinate immediately. She has lost her urine prior to toileting. She also reports loss of urine with laughing and sneezing. She does pilates and feels that her pelvic floor may be weak.     Breast:  She denies breast mass, skin changes, dimpling, reddening, nipple retraction.  She denies breast discharge.  + BRCA 1. S/p bilateral mastectomy. She has had right breast reconstruction but absent left breast (implant removed 7/2024 due to infection.     Cancer-related family history includes Breast cancer in her mother and sister; Cancer in her father.    Past Medical History:   Diagnosis Date    Anxiety     Arthritis     BRCA positive 2005    Brca1    Ingrown toenail 01/03/2025    Onychomycosis     Pneumonia     PONV (postoperative nausea and vomiting)     Postoperative wound infection 04/23/2024    I have noticed redness which became worse    Visual impairment        Past Surgical History:   Procedure Laterality Date    APPENDECTOMY      BILATERAL OOPHORECTOMY      prophylactic    BREAST IMPLANT  2012    implant exchange    BREAST RECONSTRUCTION Bilateral 2006    in [place]    COLONOSCOPY  2020    COLONOSCOPY  2015    HYSTERECTOMY  2016    Ovarectomy preventive to cancer due to BRCA1    KNEE SURGERY Left 2012    meniscus/ACL    MASTECTOMY Bilateral 2006    prophylactic - in [place]    [place] REMOVAL INTACT BREAST IMPLANT Left 07/18/2024    Procedure: EXPLORATION LEFT BREAST, LEFT BREAST IMPLANT REMOVAL AND CAPSULECTOMY;  Surgeon: Kemal Watt MD;  Location:  MAIN OR;  Service: Plastics    REVISION RECONSTRUCTED BREAST Left 03/2024    in [place]         General:  Diet: Good  Exercise: Walking, pilates, yoga, minimal running  Work: In education - for international students  Safety: Yes at home with , 14yo gallego retriever, and cat    Social History     Tobacco Use    Smoking status: Never     "Smokeless tobacco: Never    Tobacco comments:     Sometimes Sigars or Pipe but very seldom   Vaping Use    Vaping status: Never Used   Substance Use Topics    Alcohol use: Yes     Comment: Very seldom    Drug use: Never       Screening:  Cervical cancer: Last pap was Feb 2024. She has never had an abnormal pap test   Breast cancer: UTD  Colon cancer: last colonoscopy in 01/29/2025. Results were significant for multiple benign polyps.  STD screening: None.    Review of Systems   Constitutional:  Negative for appetite change, chills and fever.   HENT:  Negative for trouble swallowing.    Respiratory:  Negative for shortness of breath.    Cardiovascular:  Negative for chest pain.   Gastrointestinal:  Negative for abdominal pain, constipation, diarrhea, nausea and vomiting.   Genitourinary:  Negative for decreased urine volume, difficulty urinating, dyspareunia, dysuria, flank pain, frequency, genital sores, hematuria, menstrual problem, pelvic pain, urgency, vaginal bleeding, vaginal discharge and vaginal pain.          Objective      /62 (BP Location: Left arm, Patient Position: Sitting, Cuff Size: Standard)   Ht 5' 11\" (1.803 m)   Wt 65.8 kg (145 lb)   LMP  (LMP Unknown)   BMI 20.22 kg/m²   Physical Exam  Vitals reviewed.   Constitutional:       General: She is not in acute distress.     Appearance: Normal appearance. She is well-developed. She is not ill-appearing, toxic-appearing or diaphoretic.   Neck:      Thyroid: No thyroid mass, thyromegaly or thyroid tenderness.   Cardiovascular:      Rate and Rhythm: Normal rate and regular rhythm.      Heart sounds: Normal heart sounds. No murmur heard.     No friction rub. No gallop.   Pulmonary:      Effort: Pulmonary effort is normal. No respiratory distress.      Breath sounds: Normal breath sounds. No stridor. No wheezing, rhonchi or rales.   Chest:      Chest wall: No tenderness.   Breasts:     Breasts are symmetrical.      Right: No swelling, bleeding, " inverted nipple, mass, nipple discharge, skin change or tenderness.      Left: Absent. No swelling, bleeding, inverted nipple, mass, nipple discharge, skin change or tenderness.       Abdominal:      General: There is no distension.      Palpations: Abdomen is soft.      Tenderness: There is no abdominal tenderness.   Genitourinary:     General: Normal vulva.      Exam position: Lithotomy position.      Labia:         Right: No rash, tenderness, lesion or injury.         Left: No rash, tenderness, lesion or injury.       Urethra: No prolapse or urethral lesion.      Vagina: Normal. No signs of injury and foreign body. No vaginal discharge, erythema, tenderness, bleeding, lesions or prolapsed vaginal walls.      Cervix: No cervical motion tenderness, discharge, friability, lesion, erythema, cervical bleeding or eversion.      Uterus: Not deviated, not enlarged, not fixed, not tender and no uterine prolapse.       Adnexa:         Right: No mass, tenderness or fullness.          Left: No mass, tenderness or fullness.        Rectum: No external hemorrhoid.   Lymphadenopathy:      Cervical: No cervical adenopathy.      Upper Body:      Right upper body: No supraclavicular, axillary or pectoral adenopathy.      Left upper body: No supraclavicular, axillary or pectoral adenopathy.   Skin:     General: Skin is warm and dry.   Neurological:      Mental Status: She is alert and oriented to person, place, and time.   Psychiatric:         Behavior: Behavior normal. Behavior is cooperative.                 Assessment & Plan  Encounter for routine gynecological examination with Papanicolaou smear of cervix  GYN concerns today: HRT & LIU  Birth control: N/A  Cervical cancer screening: Collected today  Breast Cancer screening: UTD on necessary imaging; s/p bilateral mastectomy  Colon cancer Screening: Due 1/29/2028  STD screening: Declined  Reviewed healthy lifestyle and safe sex practices  RTO for annual exam or PRN  Orders:     Thinprep Tis and HPV mRNA E6/E7    Stress incontinence  Referral given to PFPT at this time.  Will consider urogynecology referral if necessary  Orders:    Ambulatory Referral to Physical Therapy; Future    Menopausal symptoms  Continue NE tabs (0.35mg) and Vivelle dot (1 patch 2x weekly @ 0.05mg/24hr)   Refills given  Orders:    estradiol (Vivelle-Dot) 0.05 MG/24HR; Place 1 patch on the skin 2 (two) times a week    norethindrone (Jencycla) 0.35 MG tablet; Take 1 tablet (0.35 mg total) by mouth daily      Emani Mckeon MD  OB/GYN  3/21/2025  5:21 PM

## 2025-03-21 NOTE — ASSESSMENT & PLAN NOTE
Continue NE tabs (0.35mg) and Vivelle dot (1 patch 2x weekly @ 0.05mg/24hr)   Refills given  Orders:    estradiol (Vivelle-Dot) 0.05 MG/24HR; Place 1 patch on the skin 2 (two) times a week    norethindrone (Jencycla) 0.35 MG tablet; Take 1 tablet (0.35 mg total) by mouth daily

## 2025-03-24 ENCOUNTER — OFFICE VISIT (OUTPATIENT)
Dept: FAMILY MEDICINE CLINIC | Facility: CLINIC | Age: 55
End: 2025-03-24
Payer: COMMERCIAL

## 2025-03-24 ENCOUNTER — PATIENT MESSAGE (OUTPATIENT)
Dept: FAMILY MEDICINE CLINIC | Facility: CLINIC | Age: 55
End: 2025-03-24

## 2025-03-24 VITALS
DIASTOLIC BLOOD PRESSURE: 60 MMHG | SYSTOLIC BLOOD PRESSURE: 108 MMHG | HEIGHT: 71 IN | TEMPERATURE: 97.6 F | HEART RATE: 59 BPM | BODY MASS INDEX: 20.3 KG/M2 | WEIGHT: 145 LBS | OXYGEN SATURATION: 99 % | RESPIRATION RATE: 18 BRPM

## 2025-03-24 DIAGNOSIS — F32.A ANXIETY AND DEPRESSION: ICD-10-CM

## 2025-03-24 DIAGNOSIS — F41.9 ANXIETY AND DEPRESSION: ICD-10-CM

## 2025-03-24 DIAGNOSIS — Z13.220 SCREENING, LIPID: ICD-10-CM

## 2025-03-24 DIAGNOSIS — N95.1 MENOPAUSAL SYMPTOMS: ICD-10-CM

## 2025-03-24 DIAGNOSIS — F51.01 PRIMARY INSOMNIA: ICD-10-CM

## 2025-03-24 DIAGNOSIS — Z13.29 SCREENING FOR THYROID DISORDER: ICD-10-CM

## 2025-03-24 DIAGNOSIS — Z00.00 ANNUAL PHYSICAL EXAM: Primary | ICD-10-CM

## 2025-03-24 DIAGNOSIS — Z13.1 SCREENING FOR DIABETES MELLITUS: ICD-10-CM

## 2025-03-24 DIAGNOSIS — Z15.01 BRCA1 GENE MUTATION POSITIVE: ICD-10-CM

## 2025-03-24 DIAGNOSIS — Z15.09 BRCA1 GENE MUTATION POSITIVE: ICD-10-CM

## 2025-03-24 PROBLEM — H53.8 BLURRY VISION: Status: RESOLVED | Noted: 2024-09-12 | Resolved: 2025-03-24

## 2025-03-24 PROBLEM — M17.10 KNEE ARTHROPATHY: Status: ACTIVE | Noted: 2018-10-08

## 2025-03-24 PROBLEM — E03.8 SUBCLINICAL HYPOTHYROIDISM: Status: RESOLVED | Noted: 2024-09-12 | Resolved: 2025-03-24

## 2025-03-24 PROBLEM — L03.90 CELLULITIS: Status: RESOLVED | Noted: 2024-04-30 | Resolved: 2025-03-24

## 2025-03-24 PROCEDURE — 99396 PREV VISIT EST AGE 40-64: CPT | Performed by: NURSE PRACTITIONER

## 2025-03-24 RX ORDER — METHYLPREDNISOLONE 4 MG/1
TABLET ORAL
COMMUNITY
Start: 2025-03-19

## 2025-03-24 NOTE — ASSESSMENT & PLAN NOTE
Stable  Cont meds      Orders:    Comprehensive metabolic panel; Future    CBC and differential; Future    TSH, 3rd generation with Free T4 reflex; Future    Lipid Panel with Direct LDL reflex; Future

## 2025-03-24 NOTE — ASSESSMENT & PLAN NOTE
Stable    Orders:    Comprehensive metabolic panel; Future    CBC and differential; Future    TSH, 3rd generation with Free T4 reflex; Future    Lipid Panel with Direct LDL reflex; Future

## 2025-03-24 NOTE — PROGRESS NOTES
Adult Annual Physical  Name: Anne Calderón      : 1970      MRN: [id number]  Encounter Provider: DAVIS Colin  Encounter Date: 3/24/2025   Encounter department: [facility]    Assessment & Plan  Annual physical exam    Orders:    Comprehensive metabolic panel; Future    CBC and differential; Future    TSH, 3rd generation with Free T4 reflex; Future    Lipid Panel with Direct LDL reflex; Future    BRCA1 gene mutation positive  Noted    Orders:    Comprehensive metabolic panel; Future    CBC and differential; Future    TSH, 3rd generation with Free T4 reflex; Future    Lipid Panel with Direct LDL reflex; Future    Primary insomnia  Stable    Orders:    Comprehensive metabolic panel; Future    CBC and differential; Future    TSH, 3rd generation with Free T4 reflex; Future    Lipid Panel with Direct LDL reflex; Future    Menopausal symptoms  Stable    Orders:    Comprehensive metabolic panel; Future    CBC and differential; Future    TSH, 3rd generation with Free T4 reflex; Future    Lipid Panel with Direct LDL reflex; Future    Anxiety and depression  Stable  Cont meds      Orders:    Comprehensive metabolic panel; Future    CBC and differential; Future    TSH, 3rd generation with Free T4 reflex; Future    Lipid Panel with Direct LDL reflex; Future    Screening, lipid    Orders:    Comprehensive metabolic panel; Future    CBC and differential; Future    TSH, 3rd generation with Free T4 reflex; Future    Lipid Panel with Direct LDL reflex; Future    Screening for thyroid disorder    Orders:    Comprehensive metabolic panel; Future    CBC and differential; Future    TSH, 3rd generation with Free T4 reflex; Future    Lipid Panel with Direct LDL reflex; Future    Screening for diabetes mellitus    Orders:    Comprehensive metabolic panel; Future    CBC and differential; Future    TSH, 3rd generation with Free T4 reflex; Future    Lipid Panel with Direct LDL reflex; Future      Preventive  Screenings:  - Diabetes Screening: screening up-to-date  - Cholesterol Screening: orders placed   - Hepatitis C screening: screening up-to-date   - HIV screening: screening not indicated   - Cervical cancer screening: screening up-to-date   - Breast cancer screening: screening not indicated   - Colon cancer screening: screening up-to-date   - Lung cancer screening: screening not indicated     Immunizations:  - Immunizations due: Tdap  - The patient declines recommended vaccines currently despite my recommendations      Counseling/Anticipatory Guidance:    - Diet: discussed recommendations for a healthy/well-balanced diet.   - Exercise: the importance of regular exercise/physical activity was discussed. Recommend exercise 3-5 times per week for at least 30 minutes.   - Injury prevention: discussed safety/seat belts, safety helmets, smoke detectors, carbon monoxide detectors, and smoking near bedding or upholstery.       Depression Screening and Follow-up Plan: Patient was screened for depression during today's encounter. They screened negative with a PHQ-9 score of 1.          History of Present Illness     Adult Annual Physical:  Patient presents for annual physical. Here for wellness exam  .     Diet and Physical Activity:  - Diet/Nutrition: no special diet, well balanced diet, frequent junk food, consuming 3-5 servings of fruits/vegetables daily, adequate fiber intake and adequate whole grain intake.  - Exercise: moderate cardiovascular exercise, vigorous cardiovascular exercise, strength training exercises, 5-7 times a week on average and 30-60 minutes on average.    Depression Screening:    - PHQ-9 Score: 1    General Health:  - Sleep: sleeps well and unrefreshing sleep. I take hydroconazole to sleep  - Hearing: normal hearing bilateral ears.  - Vision: most recent eye exam < 1 year ago and wears glasses.  - Dental: regular dental visits, no dental visits for > 1 year, brushes teeth twice daily and floss  "regularly.    /GYN Health:  - Follows with GYN: yes.   - Menopause: postmenopausal.   - Last menstrual cycle: 11/1/2016.   - History of STDs: no  - Contraception: menopause.      Advanced Care Planning:  - Has an advanced directive?: no    - Has a durable medical POA?: no    - ACP document given to patient?: no      Review of Systems   Constitutional:  Negative for fatigue and fever.   HENT:  Negative for congestion, postnasal drip and rhinorrhea.    Eyes:  Negative for photophobia and visual disturbance.   Respiratory:  Negative for cough, shortness of breath and wheezing.    Cardiovascular:  Negative for chest pain and palpitations.   Gastrointestinal:  Negative for constipation, diarrhea, nausea and vomiting.   Genitourinary:  Negative for dysuria and frequency.   Musculoskeletal:  Negative for arthralgias and myalgias.   Skin:  Negative for rash.   Neurological:  Negative for dizziness, light-headedness and headaches.   Hematological:  Negative for adenopathy.   Psychiatric/Behavioral:  Negative for dysphoric mood and sleep disturbance. The patient is not nervous/anxious.          Objective   /60 (BP Location: Right arm, Patient Position: Sitting, Cuff Size: Standard)   Pulse 59   Temp 97.6 °F (36.4 °C) (Tympanic)   Resp 18   Ht 5' 11.49\" (1.816 m)   Wt 65.8 kg (145 lb)   LMP 11/01/2016   SpO2 99%   BMI 19.95 kg/m²     Physical Exam  Vitals and nursing note reviewed.   Constitutional:       Appearance: Normal appearance.   HENT:      Head: Normocephalic and atraumatic.      Right Ear: Tympanic membrane, ear canal and external ear normal.      Left Ear: Tympanic membrane, ear canal and external ear normal.      Nose: Nose normal.      Mouth/Throat:      Mouth: Mucous membranes are moist.   Eyes:      Conjunctiva/sclera: Conjunctivae normal.   Cardiovascular:      Rate and Rhythm: Normal rate and regular rhythm.      Heart sounds: Normal heart sounds.   Pulmonary:      Effort: Pulmonary effort is " normal.      Breath sounds: Normal breath sounds.   Abdominal:      General: Bowel sounds are normal.      Palpations: Abdomen is soft.   Musculoskeletal:         General: Normal range of motion.      Cervical back: Normal range of motion and neck supple.   Skin:     General: Skin is warm and dry.      Capillary Refill: Capillary refill takes less than 2 seconds.   Neurological:      General: No focal deficit present.      Mental Status: She is alert and oriented to person, place, and time.   Psychiatric:         Mood and Affect: Mood normal.         Behavior: Behavior normal.         Thought Content: Thought content normal.         Judgment: Judgment normal.

## 2025-03-24 NOTE — ASSESSMENT & PLAN NOTE
Noted    Orders:    Comprehensive metabolic panel; Future    CBC and differential; Future    TSH, 3rd generation with Free T4 reflex; Future    Lipid Panel with Direct LDL reflex; Future

## 2025-03-24 NOTE — PATIENT INSTRUCTIONS
"Patient Education     Routine physical for adults   The Basics   Written by the doctors and editors at [facility]   What is a physical? -- A physical is a routine visit, or \"check-up,\" with your doctor. You might also hear it called a \"wellness visit\" or \"preventive visit.\"  During each visit, the doctor will:   Ask about your physical and mental health   Ask about your habits, behaviors, and lifestyle   Do an exam   Give you vaccines if needed   Talk to you about any medicines you take   Give advice about your health   Answer your questions  Getting regular check-ups is an important part of taking care of your health. It can help your doctor find and treat any problems you have. But it's also important for preventing health problems.  A routine physical is different from a \"sick visit.\" A sick visit is when you see a doctor because of a health concern or problem. Since physicals are scheduled ahead of time, you can think about what you want to ask the doctor.  How often should I get a physical? -- It depends on your age and health. In general, for people age 21 years and older:   If you are younger than 50 years, you might be able to get a physical every 3 years.   If you are 50 years or older, your doctor might recommend a physical every year.  If you have an ongoing health condition, like diabetes or high blood pressure, your doctor will probably want to see you more often.  What happens during a physical? -- In general, each visit will include:   Physical exam - The doctor or nurse will check your height, weight, heart rate, and blood pressure. They will also look at your eyes and ears. They will ask about how you are feeling and whether you have any symptoms that bother you.   Medicines - It's a good idea to bring a list of all the medicines you take to each doctor visit. Your doctor will talk to you about your medicines and answer any questions. Tell them if you are having any side effects that bother you. You " "should also tell them if you are having trouble paying for any of your medicines.   Habits and behaviors - This includes:   Your diet   Your exercise habits   Whether you smoke, drink alcohol, or use drugs   Whether you are sexually active   Whether you feel safe at home  Your doctor will talk to you about things you can do to improve your health and lower your risk of health problems. They will also offer help and support. For example, if you want to quit smoking, they can give you advice and might prescribe medicines. If you want to improve your diet or get more physical activity, they can help you with this, too.   Lab tests, if needed - The tests you get will depend on your age and situation. For example, your doctor might want to check your:   Cholesterol   Blood sugar   Iron level   Vaccines - The recommended vaccines will depend on your age, health, and what vaccines you already had. Vaccines are very important because they can prevent certain serious or deadly infections.   Discussion of screening - \"Screening\" means checking for diseases or other health problems before they cause symptoms. Your doctor can recommend screening based on your age, risk, and preferences. This might include tests to check for:   Cancer, such as breast, prostate, cervical, ovarian, colorectal, prostate, lung, or skin cancer   Sexually transmitted infections, such as chlamydia and gonorrhea   Mental health conditions like depression and anxiety  Your doctor will talk to you about the different types of screening tests. They can help you decide which screenings to have. They can also explain what the results might mean.   Answering questions - The physical is a good time to ask the doctor or nurse questions about your health. If needed, they can refer you to other doctors or specialists, too.  Adults older than 65 years often need other care, too. As you get older, your doctor will talk to you about:   How to prevent falling at " home   Hearing or vision tests   Memory testing   How to take your medicines safely   Making sure that you have the help and support you need at home  All topics are updated as new evidence becomes available and our peer review process is complete.  This topic retrieved from [employer] on: May 02, 2024.  Topic 028764 Version 1.0  Release: 32.4.3 - C32.122  © 2024 UpToDate, Inc. and/or its affiliates. All rights reserved.  Consumer Information Use and Disclaimer   Disclaimer: This generalized information is a limited summary of diagnosis, treatment, and/or medication information. It is not meant to be comprehensive and should be used as a tool to help the user understand and/or assess potential diagnostic and treatment options. It does NOT include all information about conditions, treatments, medications, side effects, or risks that may apply to a specific patient. It is not intended to be medical advice or a substitute for the medical advice, diagnosis, or treatment of a health care provider based on the health care provider's examination and assessment of a patient's specific and unique circumstances. Patients must speak with a health care provider for complete information about their health, medical questions, and treatment options, including any risks or benefits regarding use of medications. This information does not endorse any treatments or medications as safe, effective, or approved for treating a specific patient. UpToDate, Inc. and its affiliates disclaim any warranty or liability relating to this information or the use thereof.The use of this information is governed by the Terms of Use, available at https://www.woltersFotoupuwer.com/en/know/clinical-effectiveness-terms. 2024© UpToDate, Inc. and its affiliates and/or licensors. All rights reserved.  Copyright   © 2024 UpToDate, Inc. and/or its affiliates. All rights reserved.

## 2025-03-27 ENCOUNTER — RESULTS FOLLOW-UP (OUTPATIENT)
Dept: OBGYN CLINIC | Facility: CLINIC | Age: 55
End: 2025-03-27

## 2025-03-27 ENCOUNTER — CLINICAL SUPPORT (OUTPATIENT)
Dept: OBGYN CLINIC | Facility: OTHER | Age: 55
End: 2025-03-27

## 2025-03-27 ENCOUNTER — TELEPHONE (OUTPATIENT)
Dept: OBGYN CLINIC | Facility: OTHER | Age: 55
End: 2025-03-27

## 2025-03-27 DIAGNOSIS — Z15.09 BRCA GENE POSITIVE: Primary | ICD-10-CM

## 2025-03-27 DIAGNOSIS — Z15.01 BRCA GENE POSITIVE: Primary | ICD-10-CM

## 2025-03-27 LAB
CLINICAL INFO: NORMAL
CYTO CVX: NORMAL
CYTOLOGY CMNT CVX/VAG CYTO-IMP: NORMAL
DATE PREVIOUS BX: NORMAL
HPV E6+E7 MRNA CVX QL NAA+PROBE: NOT DETECTED
LMP START DATE: NORMAL
SL AMB PREV. PAP:: NORMAL
SPECIMEN SOURCE CVX/VAG CYTO: NORMAL

## 2025-03-27 NOTE — PROGRESS NOTES
Mastectomy Bra Fitting Order Details    Anne Calderón  1970  [id number]    Reason For Visit  Mastectomy bra and prosthesis    Precautions   History of breat cancer     Subjective  Anne is wearing her prosthesis and oly bra however this shifts at times.     Surgery Type: Mastectomy    Lymph node removal no    Date of surgery 2006    Surgical side bilateral    Objective  Anne would benefit from a contact adapt air to achieve symmetry without prosthesis shifting inside bra pocket    Assessment  Measurements from 10/2024  Cup - 16 x 2 - 32 + 4 = 36  Band 1- 19 x 2 = 38     Plan  Ship to home . Reviewed wear and care of products as well as how to don prosthesis     Order Details   Adapt air contact style 334 size 6 x 1   Ship to home

## 2025-04-07 DIAGNOSIS — R10.84 GENERALIZED ABDOMINAL PAIN: Primary | ICD-10-CM

## 2025-04-09 ENCOUNTER — HOSPITAL ENCOUNTER (OUTPATIENT)
Dept: RADIOLOGY | Age: 55
Discharge: HOME | End: 2025-04-09
Payer: COMMERCIAL

## 2025-04-09 DIAGNOSIS — R10.84 GENERALIZED ABDOMINAL PAIN: ICD-10-CM

## 2025-04-09 PROCEDURE — 76700 US EXAM ABDOM COMPLETE: CPT

## 2025-04-11 DIAGNOSIS — N61.1 LEFT BREAST ABSCESS: ICD-10-CM

## 2025-04-14 ENCOUNTER — APPOINTMENT (OUTPATIENT)
Dept: LAB | Age: 55
End: 2025-04-14
Payer: COMMERCIAL

## 2025-04-14 ENCOUNTER — RESULTS FOLLOW-UP (OUTPATIENT)
Dept: FAMILY MEDICINE CLINIC | Facility: CLINIC | Age: 55
End: 2025-04-14

## 2025-04-14 DIAGNOSIS — F32.A ANXIETY AND DEPRESSION: ICD-10-CM

## 2025-04-14 DIAGNOSIS — K76.89 LIVER NODULE: Primary | ICD-10-CM

## 2025-04-14 DIAGNOSIS — Z13.1 SCREENING FOR DIABETES MELLITUS: ICD-10-CM

## 2025-04-14 DIAGNOSIS — N95.1 MENOPAUSAL SYMPTOMS: ICD-10-CM

## 2025-04-14 DIAGNOSIS — E80.6 HYPERBILIRUBINEMIA: ICD-10-CM

## 2025-04-14 DIAGNOSIS — D72.829 LEUKOCYTOSIS, UNSPECIFIED TYPE: ICD-10-CM

## 2025-04-14 DIAGNOSIS — F41.9 ANXIETY AND DEPRESSION: ICD-10-CM

## 2025-04-14 DIAGNOSIS — Z15.01 BRCA1 GENE MUTATION POSITIVE: ICD-10-CM

## 2025-04-14 DIAGNOSIS — Z13.220 SCREENING, LIPID: ICD-10-CM

## 2025-04-14 DIAGNOSIS — Z13.29 SCREENING FOR THYROID DISORDER: ICD-10-CM

## 2025-04-14 DIAGNOSIS — Z00.00 ANNUAL PHYSICAL EXAM: ICD-10-CM

## 2025-04-14 DIAGNOSIS — F51.01 PRIMARY INSOMNIA: ICD-10-CM

## 2025-04-14 DIAGNOSIS — Z15.09 BRCA1 GENE MUTATION POSITIVE: ICD-10-CM

## 2025-04-14 LAB
ALBUMIN SERPL BCG-MCNC: 4.2 G/DL (ref 3.5–5)
ALP SERPL-CCNC: 38 U/L (ref 34–104)
ALT SERPL W P-5'-P-CCNC: 21 U/L (ref 7–52)
ANION GAP SERPL CALCULATED.3IONS-SCNC: 6 MMOL/L (ref 4–13)
AST SERPL W P-5'-P-CCNC: 24 U/L (ref 13–39)
BASOPHILS # BLD AUTO: 0.03 THOUSANDS/ÂΜL (ref 0–0.1)
BASOPHILS NFR BLD AUTO: 1 % (ref 0–1)
BILIRUB SERPL-MCNC: 1.19 MG/DL (ref 0.2–1)
BUN SERPL-MCNC: 12 MG/DL (ref 5–25)
CALCIUM SERPL-MCNC: 8.9 MG/DL (ref 8.4–10.2)
CHLORIDE SERPL-SCNC: 104 MMOL/L (ref 96–108)
CHOLEST SERPL-MCNC: 173 MG/DL (ref ?–200)
CO2 SERPL-SCNC: 28 MMOL/L (ref 21–32)
CREAT SERPL-MCNC: 0.79 MG/DL (ref 0.6–1.3)
EOSINOPHIL # BLD AUTO: 0.07 THOUSAND/ÂΜL (ref 0–0.61)
EOSINOPHIL NFR BLD AUTO: 2 % (ref 0–6)
ERYTHROCYTE [DISTWIDTH] IN BLOOD BY AUTOMATED COUNT: 12.8 % (ref 11.6–15.1)
GFR SERPL CREATININE-BSD FRML MDRD: 85 ML/MIN/1.73SQ M
GLUCOSE P FAST SERPL-MCNC: 87 MG/DL (ref 65–99)
HCT VFR BLD AUTO: 38.9 % (ref 34.8–46.1)
HDLC SERPL-MCNC: 62 MG/DL
HGB BLD-MCNC: 13.1 G/DL (ref 11.5–15.4)
IMM GRANULOCYTES # BLD AUTO: 0.01 THOUSAND/UL (ref 0–0.2)
IMM GRANULOCYTES NFR BLD AUTO: 0 % (ref 0–2)
LDLC SERPL CALC-MCNC: 101 MG/DL (ref 0–100)
LYMPHOCYTES # BLD AUTO: 1.41 THOUSANDS/ÂΜL (ref 0.6–4.47)
LYMPHOCYTES NFR BLD AUTO: 41 % (ref 14–44)
MCH RBC QN AUTO: 31.1 PG (ref 26.8–34.3)
MCHC RBC AUTO-ENTMCNC: 33.7 G/DL (ref 31.4–37.4)
MCV RBC AUTO: 92 FL (ref 82–98)
MONOCYTES # BLD AUTO: 0.34 THOUSAND/ÂΜL (ref 0.17–1.22)
MONOCYTES NFR BLD AUTO: 10 % (ref 4–12)
NEUTROPHILS # BLD AUTO: 1.58 THOUSANDS/ÂΜL (ref 1.85–7.62)
NEUTS SEG NFR BLD AUTO: 46 % (ref 43–75)
NRBC BLD AUTO-RTO: 0 /100 WBCS
PLATELET # BLD AUTO: 172 THOUSANDS/UL (ref 149–390)
PMV BLD AUTO: 9 FL (ref 8.9–12.7)
POTASSIUM SERPL-SCNC: 4.2 MMOL/L (ref 3.5–5.3)
PROT SERPL-MCNC: 6.5 G/DL (ref 6.4–8.4)
RBC # BLD AUTO: 4.21 MILLION/UL (ref 3.81–5.12)
SODIUM SERPL-SCNC: 138 MMOL/L (ref 135–147)
TRIGL SERPL-MCNC: 50 MG/DL (ref ?–150)
TSH SERPL DL<=0.05 MIU/L-ACNC: 2.97 UIU/ML (ref 0.45–4.5)
WBC # BLD AUTO: 3.44 THOUSAND/UL (ref 4.31–10.16)

## 2025-04-14 PROCEDURE — 80061 LIPID PANEL: CPT

## 2025-04-14 PROCEDURE — 84443 ASSAY THYROID STIM HORMONE: CPT

## 2025-04-14 PROCEDURE — 85025 COMPLETE CBC W/AUTO DIFF WBC: CPT

## 2025-04-14 PROCEDURE — 36415 COLL VENOUS BLD VENIPUNCTURE: CPT

## 2025-04-14 PROCEDURE — 80053 COMPREHEN METABOLIC PANEL: CPT

## 2025-04-15 RX ORDER — SULFAMETHOXAZOLE AND TRIMETHOPRIM 800; 160 MG/1; MG/1
1 TABLET ORAL 2 TIMES DAILY
Qty: 20 TABLET | Refills: 0 | OUTPATIENT
Start: 2025-04-15 | End: 2025-04-25

## 2025-04-15 NOTE — TELEPHONE ENCOUNTER
Patient is no longer under my care.  She should reach out to her Plastic Surgery team for medical management.

## 2025-04-16 ENCOUNTER — EVALUATION (OUTPATIENT)
Dept: PHYSICAL THERAPY | Facility: REHABILITATION | Age: 55
End: 2025-04-16
Payer: COMMERCIAL

## 2025-04-16 DIAGNOSIS — M62.89 PELVIC FLOOR DYSFUNCTION: Primary | ICD-10-CM

## 2025-04-16 DIAGNOSIS — F41.9 ANXIETY AND DEPRESSION: ICD-10-CM

## 2025-04-16 DIAGNOSIS — F32.A ANXIETY AND DEPRESSION: ICD-10-CM

## 2025-04-16 DIAGNOSIS — N39.3 STRESS INCONTINENCE: ICD-10-CM

## 2025-04-16 PROCEDURE — 97112 NEUROMUSCULAR REEDUCATION: CPT

## 2025-04-16 PROCEDURE — 97161 PT EVAL LOW COMPLEX 20 MIN: CPT

## 2025-04-16 NOTE — PROGRESS NOTES
PT Evaluation     Today's Date: 2025  Patient name: Anne Calderón  : 1970  MRN: [id number]  Referring provider: Mena Coates*  Dx:  Encounter Diagnosis     ICD-10-CM    1. Pelvic floor dysfunction  M62.89       2. Stress incontinence  N39.3 Ambulatory Referral to Physical Therapy          Start Time: 0800  Stop Time: 0900  Total time in clinic (min): 60 minutes       Assessment/Plan    Assessment:     Anne Calderón is a 55 y.o.  female seen for UUI, urgency, frequency, and LIU.  Patient's presentation consistent with mild PFMD and mild behavioral attributions linking to subjective impairements. Notable impairments upon evaluation consist of:  absent cough relfex, mild breathing coordination impairments, and behavioral impairments  . These impairments limiting functional activities such as appropriate urinary retention , impairing personal hygiene, exercise, increased pt distress, transfer function, and impaired QOL. Anne Calderón is a good candidate for physical therapy and will benefit from skilled care to address impairments and achieve goals. POC: urge deferral, PFM coordination, breathing mechanics, functional strengthening, abdominal strengthening, and urge deferral/bladder training  .    During initial evaluation, education was provided on anatomy and function of the pelvic floor muscles and provided written and verbal consent for pelvic floor muscle exam. Patient also educated on diagnosis, plan of care and prognosis. Pt is in agreement with recommended plan of care and goals for therapy.    Goals:   Bladder:  In 5 weeks, patient will report ability to successfully suppress an urge to empty for at least 20' or greater w/o no UI.   In 10 weeks, patient will report improvement in urinary incontinence as measured by 0 leaks in 14/14 days.  In 10 weeks, patient will demonstrate normalized daytime void interval of 2-4 hours with adequate fluid intake.   In 10 weeks, patient will reduce  nocturia to 0-1 per night to indicate improve sleep quality/ quantity.   In 10 weeks, patient will implement urge suppression strategies throughout the day and reports that her average voiding interval is 2+ hours upon discharge.     Function:   In 5 weeks, patient will report at least 50% improvement in subjective sxs presentation since start of PT care.   In 10 weeks, patient will report at least 90% improvement in subjective sxs presentation since start of PT care.     Pain:   In 5 weeks, patient will report 3//10 pelvic pain or less with resisted adduction compared to 10.  In 10 weeks, patient will report 0/10 pelvic pain over past week w/ current physical activity level.  In 10 weeks, patient will be able to perform 30 min of exercise w/ no pain and no cues required for proper breathing mechanics to indicate improved abdominal pressure mechanics and activity tolerance.     Outcome Measure:  In 10 weeks, patient will score at least 20 or less on PFDI to indicate meaningful change from 102 at IE.    Plan    Frequency: 1x week  Duration in weeks: 10  Plan of Care beginning date: 2025  Plan of Care expiration date: 25    Subjective    Chief Complaint: UUI, LIU, urgency, and frequency   HPI: Anne Calderón is a 55 y.o.  female referred by OBGYN for complaints of LIU and UUI. Notes sxs have been present for 6-7 years w/ idiopathic onset. LIU occurs daily/weekly during coughing, sneezing, and occasional transfers w/ full bladder.   Notes concurrent urgency, frequency, and subsequent UUI upon waking in the morning.  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 Void interval of ~1.5-2 hours. Does not require pads, small volume of leakage but could occur throughout the day. Leakage occurring daily.  Fluid intake: Herbal and green tea 16 oz, Water 30 oz, Kombucha   Denies:  hesitancy, nocturia, and nocturnal enuresis    Bowel:     Evacuates regularly. [place] stool type 4-5.   Denies constipation, diarrhea , and painful evacuation   GYN:      with vaginal deliveries.   Menopausal: bilateral oophorectomy at the age of 46.    Sexual Function:     Sexually Active: Yes - denies dysfunction    MSK:      Current exercise: Walking, pilates, yoga, occasional running   Pain:      L sided internal pelvic pain - onset was several years ago during running. Occurs during adduction of L side  0/10 current; 0/10 at best; 6/10 at worst, sharp, shooting and will occasionally lingering w/ numb feeling   Goals:     Eliminate UI  Increase void interval  Eliminate pelvic pain             Objective           Precautions: Standard   Patient Active Problem List   Diagnosis    Anxiety and depression    BRCA1 gene mutation positive    Primary insomnia    Menopausal symptoms    S/P mastectomy, bilateral    Knee arthropathy         Diagnosis:    POC expires (Date that your POC expires) Auth Status? (BOMN, approved, pending) Unit limit (Daily) Auth Start date Expiration date PT/OT + Visit Limit?   25 Pending         Date of Service 25        Visits Used 1        Visits Remaining         [facility] Created                  Neuro Re-Ed         Urge deferral Educated w/ internal cues   8'             Defecation mechanics         Breathing Mechanics IC   100% accuracy   HEP   5'         PFM Coordination         PFM Down Training         Internal Cueing          Biofeedback                  Ther Ex         PFM Strengthening         Hip strengthening         Functional Strengthening  **       Abdominal Strengthening         Aerobic  **       Therapeutic Rest Breaks         Mobility          High Impact         UE Strengthening                   Ther Activity         Voiding Diaries         Review of sxs         Fluid Intake                  Manual Ther         PFM exam Performed         Ortho exam          Dynamometer Testing         Fascial Decompression         Bowel Massage         PFM stretching                  Modalities                           Outcome Measure          PFDI - 102

## 2025-04-30 ENCOUNTER — OFFICE VISIT (OUTPATIENT)
Dept: PHYSICAL THERAPY | Facility: REHABILITATION | Age: 55
End: 2025-04-30
Attending: OBSTETRICS & GYNECOLOGY
Payer: COMMERCIAL

## 2025-04-30 DIAGNOSIS — N39.3 STRESS INCONTINENCE: Primary | ICD-10-CM

## 2025-04-30 DIAGNOSIS — M62.89 PELVIC FLOOR DYSFUNCTION: ICD-10-CM

## 2025-04-30 PROCEDURE — 97140 MANUAL THERAPY 1/> REGIONS: CPT

## 2025-04-30 PROCEDURE — 97110 THERAPEUTIC EXERCISES: CPT

## 2025-04-30 NOTE — PROGRESS NOTES
Daily Note     Today's date: 2025  Patient name: Anne Calderón  : 1970  MRN: [id number]  Referring provider: Mena Coates*  Dx:   Encounter Diagnosis     ICD-10-CM    1. Stress incontinence  N39.3       2. Pelvic floor dysfunction  M62.89                      Chief Complaint: UUI, LIU, urgency, and frequency   HPI: Anne Calderón is a 55 y.o.  female referred by OBGYN for complaints of LIU and UUI. Notes sxs have been present for 6-7 years w/ idiopathic onset. LIU occurs daily/weekly during coughing, sneezing, and occasional transfers w/ full bladder.   Notes concurrent urgency, frequency, and subsequent UUI upon waking in the morning.  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  Additionally reports more awareness of pelvic floor muscles over past week.      Objective: See treatment diary below      Assessment: Tolerated treatment well.  Patient's chief complaint of L sided pelvic girdle pain reproduced during treadmill walking.  Performed myofacial release externally of OI with reproduction but positive impact on symptoms following.  Performed adductor release with good tolerance.  Reviewed proper transverse abdominal tensioning with good performance.  Progressed to stability training of hip IR/ER with low tension and focus on motor control.  Updated home exercise program and patient recorded video of exercises. Patient would benefit from continued PT.      Plan: Continue per plan of care       Precautions: Standard   Patient Active Problem List   Diagnosis    Anxiety and depression    BRCA1 gene mutation positive    Primary insomnia    Menopausal symptoms    S/P mastectomy, bilateral    Knee arthropathy         Diagnosis:    POC expires (Date that your POC expires) Auth Status? (BOMN, approved, pending) Unit limit (Daily) Auth Start date  Expiration date PT/OT + Visit Limit?   6/25/25 Pending         Date of Service 04/16/25 4/30       Visits Used 1 2       Visits Remaining         [facility] Created                  Neuro Re-Ed         Urge deferral Educated w/ internal cues   8'      Reviewed       Defecation mechanics         Breathing Mechanics IC   100% accuracy   HEP   5'         PFM Coordination         PFM Down Training         Internal Cueing          Biofeedback                  Ther Ex         PFM Strengthening         Hip strengthening         Functional Strengthening  Reviewed hip IR/ER in standing x 10    Resisted IR and ER with slight hip hinge Y TB x 10    HEP       Abdominal Strengthening         Aerobic  TM 5', 5' reassess after manual       Therapeutic Rest Breaks         Mobility          High Impact         UE Strengthening                   Ther Activity         Voiding Diaries         Review of sxs         Fluid Intake                  Manual Ther         PFM exam Performed         Ortho exam         Dynamometer Testing         Fascial Decompression         Bowel Massage         PFM stretching           Adductor fascial decompression with roller    Obturator internus trigger point release    X 3    15' TA activation x 10  Straight leg raise x 10    Supine marches x 10       Modalities                           Outcome Measure          PFDI - 102

## 2025-05-07 ENCOUNTER — APPOINTMENT (OUTPATIENT)
Dept: PHYSICAL THERAPY | Facility: REHABILITATION | Age: 55
End: 2025-05-07
Attending: OBSTETRICS & GYNECOLOGY
Payer: COMMERCIAL

## 2025-05-13 ENCOUNTER — OFFICE VISIT (OUTPATIENT)
Dept: PHYSICAL THERAPY | Facility: REHABILITATION | Age: 55
End: 2025-05-13
Attending: OBSTETRICS & GYNECOLOGY
Payer: COMMERCIAL

## 2025-05-13 DIAGNOSIS — N39.3 STRESS INCONTINENCE: Primary | ICD-10-CM

## 2025-05-13 DIAGNOSIS — M62.89 PELVIC FLOOR DYSFUNCTION: ICD-10-CM

## 2025-05-13 PROCEDURE — 97140 MANUAL THERAPY 1/> REGIONS: CPT

## 2025-05-13 NOTE — PROGRESS NOTES
Daily Note     Today's date: 2025  Patient name: Anne Calderón  : 1970  MRN: [id number]  Referring provider: Mena Coates*  Dx:   Encounter Diagnosis     ICD-10-CM    1. Stress incontinence  N39.3       2. Pelvic floor dysfunction  M62.89             Start Time: 1715  Stop Time: 1800  Total time in clinic (min): 45 minutes    Chief Complaint: UUI, LIU, urgency, and frequency   HPI: Anne Calderón is a 55 y.o.  female referred by OBGYN for complaints of LIU and UUI. Notes sxs have been present for 6-7 years w/ idiopathic onset. LIU occurs daily/weekly during coughing, sneezing, and occasional transfers w/ full bladder.   Notes concurrent urgency, frequency, and subsequent UUI upon waking in the morning.  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 Walked over 25 km in [place].     Objective: See treatment diary below      Assessment: Tolerated treatment well.  Was agreeable to PFM stretching, L side tension which reproduced her pain. Proximal adductor release seem to target specifically the area which is bothering her.  Periurethral release only reproduced symptoms on her R side. Patient would benefit from continued PT.      Plan: Continue per plan of care       Precautions: Standard   Patient Active Problem List   Diagnosis    Anxiety and depression    BRCA1 gene mutation positive    Primary insomnia    Menopausal symptoms    S/P mastectomy, bilateral    Knee arthropathy         Diagnosis:    POC expires (Date that your POC expires) Auth Status? (BOMN, approved, pending) Unit limit (Daily) Auth Start date Expiration date PT/OT + Visit Limit?   25 Pending         Date of Service 25      Visits Used 1 2 3      Visits Remaining         [facility]                  Neuro Re-Ed         Urge deferral Educated w/ internal cues   8'      Reviewed       Defecation mechanics          Breathing Mechanics IC   100% accuracy   HEP   5'         PFM Coordination         PFM Down Training         Internal Cueing          Biofeedback                  Ther Ex         PFM Strengthening         Hip strengthening         Functional Strengthening  Reviewed hip IR/ER in standing x 10    Resisted IR and ER with slight hip hinge Y TB x 10    HEP       Abdominal Strengthening         Aerobic  TM 5', 5' reassess after manual       Therapeutic Rest Breaks         Mobility          High Impact         UE Strengthening                   Ther Activity         Voiding Diaries         Review of sxs         Fluid Intake                  Manual Ther         PFM exam Performed         Ortho exam         Dynamometer Testing         Fascial Decompression         Bowel Massage         PFM stretching   40' intravaginally & external OI        Adductor fascial decompression with roller    Obturator internus trigger point release    X 3    15' TA activation x 10  Straight leg raise x 10    Supine marches x 10       Modalities                           Outcome Measure          PFDI - 102

## 2025-05-14 ENCOUNTER — APPOINTMENT (OUTPATIENT)
Dept: LAB | Age: 55
End: 2025-05-14
Attending: NURSE PRACTITIONER

## 2025-05-14 ENCOUNTER — APPOINTMENT (OUTPATIENT)
Dept: PHYSICAL THERAPY | Facility: REHABILITATION | Age: 55
End: 2025-05-14
Attending: OBSTETRICS & GYNECOLOGY
Payer: COMMERCIAL

## 2025-05-14 DIAGNOSIS — K76.89 LIVER NODULE: ICD-10-CM

## 2025-05-14 DIAGNOSIS — E80.6 HYPERBILIRUBINEMIA: ICD-10-CM

## 2025-05-14 DIAGNOSIS — D72.829 LEUKOCYTOSIS, UNSPECIFIED TYPE: ICD-10-CM

## 2025-05-14 LAB
ALBUMIN SERPL BCG-MCNC: 4.4 G/DL (ref 3.5–5)
ALP SERPL-CCNC: 38 U/L (ref 34–104)
ALT SERPL W P-5'-P-CCNC: 17 U/L (ref 7–52)
ANION GAP SERPL CALCULATED.3IONS-SCNC: 8 MMOL/L (ref 4–13)
AST SERPL W P-5'-P-CCNC: 18 U/L (ref 13–39)
BASOPHILS # BLD AUTO: 0.03 THOUSANDS/ÂΜL (ref 0–0.1)
BASOPHILS NFR BLD AUTO: 1 % (ref 0–1)
BILIRUB SERPL-MCNC: 0.93 MG/DL (ref 0.2–1)
BUN SERPL-MCNC: 13 MG/DL (ref 5–25)
CALCIUM SERPL-MCNC: 9.2 MG/DL (ref 8.4–10.2)
CHLORIDE SERPL-SCNC: 104 MMOL/L (ref 96–108)
CO2 SERPL-SCNC: 27 MMOL/L (ref 21–32)
CREAT SERPL-MCNC: 0.83 MG/DL (ref 0.6–1.3)
EOSINOPHIL # BLD AUTO: 0.07 THOUSAND/ÂΜL (ref 0–0.61)
EOSINOPHIL NFR BLD AUTO: 2 % (ref 0–6)
ERYTHROCYTE [DISTWIDTH] IN BLOOD BY AUTOMATED COUNT: 12.7 % (ref 11.6–15.1)
GFR SERPL CREATININE-BSD FRML MDRD: 80 ML/MIN/1.73SQ M
GLUCOSE P FAST SERPL-MCNC: 91 MG/DL (ref 65–99)
HCT VFR BLD AUTO: 42 % (ref 34.8–46.1)
HGB BLD-MCNC: 13.9 G/DL (ref 11.5–15.4)
IMM GRANULOCYTES # BLD AUTO: 0.01 THOUSAND/UL (ref 0–0.2)
IMM GRANULOCYTES NFR BLD AUTO: 0 % (ref 0–2)
LYMPHOCYTES # BLD AUTO: 1.64 THOUSANDS/ÂΜL (ref 0.6–4.47)
LYMPHOCYTES NFR BLD AUTO: 41 % (ref 14–44)
MCH RBC QN AUTO: 31.1 PG (ref 26.8–34.3)
MCHC RBC AUTO-ENTMCNC: 33.1 G/DL (ref 31.4–37.4)
MCV RBC AUTO: 94 FL (ref 82–98)
MONOCYTES # BLD AUTO: 0.31 THOUSAND/ÂΜL (ref 0.17–1.22)
MONOCYTES NFR BLD AUTO: 8 % (ref 4–12)
NEUTROPHILS # BLD AUTO: 1.91 THOUSANDS/ÂΜL (ref 1.85–7.62)
NEUTS SEG NFR BLD AUTO: 48 % (ref 43–75)
NRBC BLD AUTO-RTO: 0 /100 WBCS
PLATELET # BLD AUTO: 190 THOUSANDS/UL (ref 149–390)
PMV BLD AUTO: 8.9 FL (ref 8.9–12.7)
POTASSIUM SERPL-SCNC: 4.8 MMOL/L (ref 3.5–5.3)
PROT SERPL-MCNC: 6.7 G/DL (ref 6.4–8.4)
RBC # BLD AUTO: 4.47 MILLION/UL (ref 3.81–5.12)
SODIUM SERPL-SCNC: 139 MMOL/L (ref 135–147)
WBC # BLD AUTO: 3.97 THOUSAND/UL (ref 4.31–10.16)

## 2025-05-14 PROCEDURE — 36415 COLL VENOUS BLD VENIPUNCTURE: CPT

## 2025-05-14 PROCEDURE — 80053 COMPREHEN METABOLIC PANEL: CPT

## 2025-05-14 PROCEDURE — 85025 COMPLETE CBC W/AUTO DIFF WBC: CPT

## 2025-05-21 ENCOUNTER — OFFICE VISIT (OUTPATIENT)
Dept: PHYSICAL THERAPY | Facility: REHABILITATION | Age: 55
End: 2025-05-21
Attending: OBSTETRICS & GYNECOLOGY
Payer: COMMERCIAL

## 2025-05-21 DIAGNOSIS — M62.89 PELVIC FLOOR DYSFUNCTION: ICD-10-CM

## 2025-05-21 DIAGNOSIS — N39.3 STRESS INCONTINENCE: Primary | ICD-10-CM

## 2025-05-21 PROCEDURE — 97140 MANUAL THERAPY 1/> REGIONS: CPT

## 2025-05-21 PROCEDURE — 97112 NEUROMUSCULAR REEDUCATION: CPT

## 2025-05-21 NOTE — PROGRESS NOTES
"Daily Note     Today's date: 2025  Patient name: Anne Calderón  : 1970  MRN: [id number]  Referring provider: Emani Mckeon MD  Dx:   Encounter Diagnosis     ICD-10-CM    1. Stress incontinence  N39.3       2. Pelvic floor dysfunction  M62.89               Start Time: 1115  Stop Time: 1215  Total time in clinic (min): 60 minutes    Chief Complaint: UUI, LIU, urgency, and frequency   HPI: Anne Calderón is a 55 y.o.  female referred by OBGYN for complaints of LIU and UUI. Notes sxs have been present for 6-7 years w/ idiopathic onset. LIU occurs daily/weekly during coughing, sneezing, and occasional transfers w/ full bladder.   Notes concurrent urgency, frequency, and subsequent UUI upon waking in the morning.  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  Less leakage still has urgency but knows the strategy. Is travelling to [place] for a few days.    Objective: See treatment diary below      Assessment: Tolerated treatment well.  Performed external OI release following MET technique which reproduced some \"popping\". Following manual added reformer exercises.  Pt attends a reformer class but we focussed on coordination of breath and pelvic floor muscle activation w/ slow controlled movements.  More challenged with single adductor.  Issued prone press ups and prone heel squeeze as HEP.  Patient would benefit from continued PT.      Plan: Continue per plan of care       Precautions: Standard   Patient Active Problem List   Diagnosis    Anxiety and depression    BRCA1 gene mutation positive    Primary insomnia    Menopausal symptoms    S/P mastectomy, bilateral    Knee arthropathy         Diagnosis:    POC expires (Date that your POC expires) Auth Status? (BOMN, approved, pending) Unit limit (Daily) Auth Start date Expiration date PT/OT + Visit Limit?   25 Pending         Date of " Service 04/16/25 4/30 5/13 5/20     Visits Used 1 2 3 4     Visits Remaining         [facility] Created                  Neuro Re-Ed         Urge deferral Educated w/ internal cues   8'      Reviewed       Defecation mechanics         Breathing Mechanics IC   100% accuracy   HEP   5'         PFM Coordination         PFM Down Training         Internal Cueing          Biofeedback         Reformer    1B1W straight legs, single adductor, tall kneel adductor, kenrick legs with arms, 90/90 w/ ball, wheelbarrow both ways but more challenged with reverse one     Prone press up    10x     Prone heel squeeze    2x10     Ther Ex         PFM Strengthening         Hip strengthening         Functional Strengthening  Reviewed hip IR/ER in standing x 10    Resisted IR and ER with slight hip hinge Y TB x 10    HEP       Abdominal Strengthening         Aerobic  TM 5', 5' reassess after manual       Therapeutic Rest Breaks         Mobility          High Impact         UE Strengthening                   Ther Activity         Voiding Diaries         Review of sxs         Fluid Intake                  Manual Ther         PFM exam Performed         Ortho exam         Dynamometer Testing         Fascial Decompression         Bowel Massage         PFM stretching   40' intravaginally & external OI External OI MET + shotgun  B/l leg pull       Adductor fascial decompression with roller    Obturator internus trigger point release    X 3    15' TA activation x 10  Straight leg raise x 10    Supine marches x 10       Modalities                           Outcome Measure          PFDI - 102

## 2025-05-21 NOTE — HOME EXERCISE EDUCATION
Program_ID:[id number]   Access Code: [email or web address]  URL: [email or web address]  Date: 05-  Prepared By: Andria Pickens Notes      Exercises      - Prone Pelvic Floor Contraction with Heel Squeeze - 1 x daily - 7 x weekly - 2 sets - 10 reps - 5 hold      - Prone Press Up - 1 x daily - 7 x weekly - 2 sets - 10 reps - 5 hold      - Standing Lumbar Extension - 1 x daily - 7 x weekly - 2 sets - 10 reps - 5 hold      - Standing Lumbar Extension at Wall - Forearms - 1 x daily - 7 x weekly - 2 sets - 10 reps - 5 hold

## 2025-05-28 ENCOUNTER — OFFICE VISIT (OUTPATIENT)
Dept: PHYSICAL THERAPY | Facility: REHABILITATION | Age: 55
End: 2025-05-28
Attending: OBSTETRICS & GYNECOLOGY
Payer: COMMERCIAL

## 2025-05-28 DIAGNOSIS — M62.89 PELVIC FLOOR DYSFUNCTION: ICD-10-CM

## 2025-05-28 DIAGNOSIS — N39.3 STRESS INCONTINENCE: Primary | ICD-10-CM

## 2025-05-28 PROCEDURE — 97140 MANUAL THERAPY 1/> REGIONS: CPT

## 2025-05-28 PROCEDURE — 97110 THERAPEUTIC EXERCISES: CPT

## 2025-05-28 NOTE — PROGRESS NOTES
Daily Note     Today's date: 2025  Patient name: Anne Calderón  : 1970  MRN: [id number]  Referring provider: Emani Mckeon MD  Dx:   Encounter Diagnosis     ICD-10-CM    1. Stress incontinence  N39.3       2. Pelvic floor dysfunction  M62.89                      Subjective: Pt returns from travelling has noticed good improvement with incontinence and urgency, continues to be bothered with L low back pain.   Objective: See treatment diary below      Assessment: Tolerated treatment well. Patient would benefit from continued PT  Audible pop with MET to address assymetry. Shown 2 different self MET.  Added more hip hinge exercises, L knee crepitus when going too low. May benefit from more reformer exercises next session and when scheduled with [facility] another pelvic floor exam. Responds well to session with decreased symptoms.        Plan: Continue per plan of care.      Precautions: Standard   Patient Active Problem List   Diagnosis    Anxiety and depression    BRCA1 gene mutation positive    Primary insomnia    Menopausal symptoms    S/P mastectomy, bilateral    Knee arthropathy         Diagnosis:    POC expires (Date that your POC expires) Auth Status? (BOMN, approved, pending) Unit limit (Daily) Auth Start date Expiration date PT/OT + Visit Limit?   25 Pending         Date of Service 25    Visits Used 1 2 3 4 5    Visits Remaining         Medbridge Created                  Neuro Re-Ed         Urge deferral Educated w/ internal cues   8'      Reviewed       Defecation mechanics         Breathing Mechanics IC   100% accuracy   HEP   5'         PFM Coordination         PFM Down Training         Internal Cueing          Biofeedback         Reformer    1B1W straight legs, single adductor, tall kneel adductor, kenrick legs with arms, 90/90 w/ ball, wheelbarrow both ways but more challenged with reverse one     Prone press up    10x     Prone heel squeeze    2x10     Ther  Ex         PFM Strengthening         Hip strengthening         Functional Strengthening  Reviewed hip IR/ER in standing x 10    Resisted IR and ER with slight hip hinge Y TB x 10    HEP   Hip hinge 10x  Tall kneel hip hinge but not fully down to avoid crepitus    Abdominal Strengthening         Aerobic  TM 5', 5' reassess after manual       Therapeutic Rest Breaks         Mobility          High Impact         UE Strengthening                   Ther Activity         Voiding Diaries         Review of sxs         Fluid Intake                  Manual Ther         PFM exam Performed         Ortho exam         Dynamometer Testing         Fascial Decompression         Bowel Massage         PFM stretching   40' intravaginally & external OI External OI MET + shotgun  B/l leg pull MET L, leg pull PSIS fascia decompression 25'      Adductor fascial decompression with roller    Obturator internus trigger point release    X 3    15' TA activation x 10  Straight leg raise x 10    Supine marches x 10       Modalities                           Outcome Measure          PFDI - 102

## 2025-06-03 ENCOUNTER — OFFICE VISIT (OUTPATIENT)
Dept: PHYSICAL THERAPY | Facility: REHABILITATION | Age: 55
End: 2025-06-03
Attending: OBSTETRICS & GYNECOLOGY
Payer: COMMERCIAL

## 2025-06-03 DIAGNOSIS — M62.89 PELVIC FLOOR DYSFUNCTION: ICD-10-CM

## 2025-06-03 DIAGNOSIS — N39.3 STRESS INCONTINENCE: Primary | ICD-10-CM

## 2025-06-03 PROCEDURE — 97110 THERAPEUTIC EXERCISES: CPT

## 2025-06-03 NOTE — PROGRESS NOTES
"Daily Note     Today's date: 6/3/2025  Patient name: Anne Calderón  : 1970  MRN: [id number]  Referring provider: Emani Mckeon MD  Dx:   Encounter Diagnosis     ICD-10-CM    1. Stress incontinence  N39.3       2. Pelvic floor dysfunction  M62.89           Start Time: 1015  Stop Time: 1100  Total time in clinic (min): 45 minutes    Subjective: Pt notes she went running (4k) and some yoga and overall felt ok.   Notes she has some \"sciatica\" pain to proximal HS. No leakage noted.   Notes she tried to MET with success mostly.  Yesterday she was standing a long time and was slightly feeling symptoms; however it  has improved since starting therapy.       Objective: See treatment diary below      Assessment: Tolerated treatment well. Today's session focused on hip hinge technique and reformer exercise to address deficits. Patien overall did well with program, some pain to L lower back with hip hinges however improved with correcting form. Patient haja benefit from ongoing PT.                  Plan: Continue per plan of care.      Precautions: Standard   Patient Active Problem List   Diagnosis    Anxiety and depression    BRCA1 gene mutation positive    Primary insomnia    Menopausal symptoms    S/P mastectomy, bilateral    Knee arthropathy         Diagnosis:    POC expires (Date that your POC expires) Auth Status? (BOMN, approved, pending) Unit limit (Daily) Auth Start date Expiration date PT/OT + Visit Limit?   25 Pending         Date of Service 25 6/3   Visits Used 1 2 3 4 5 6   Visits Remaining         Medbridge Created                  Neuro Re-Ed         Urge deferral Educated w/ internal cues   8'      Reviewed       Defecation mechanics         Breathing Mechanics IC   100% accuracy   HEP   5'         PFM Coordination         PFM Down Training         Internal Cueing          Biofeedback         Reformer    1B1W straight legs, single adductor, tall kneel adductor, " kenrick legs with arms, 90/90 w/ ball, wheelbarrow both ways but more challenged with reverse one 1B1W straight legs,     single adductor,     tall kneel adductor,     kenrick legs with arms,     90/90 w/ ball,     wheelbarrow both ways but more challenged with rev 1B1W straight legs,     single adductor,     tall kneel adductor,     kenrick legs with arms,     90/90 w/ ball,     wheelbarrow both ways but more challenged with rev   Prone press up    10x  10x    Prone heel squeeze    2x10  2x10   Ther Ex         PFM Strengthening         Hip strengthening         Functional Strengthening  Reviewed hip IR/ER in standing x 10    Resisted IR and ER with slight hip hinge Y TB x 10    HEP   Hip hinge 10x  Tall kneel hip hinge but not fully down to avoid crepitus Hip hinge 10x  Tall kneel hip hinge but not fully down to avoid crepitus   Abdominal Strengthening         Aerobic  TM 5', 5' reassess after manual       Therapeutic Rest Breaks         Mobility          High Impact         UE Strengthening                   Ther Activity         Voiding Diaries         Review of sxs         Fluid Intake                  Manual Ther         PFM exam Performed         Ortho exam         Dynamometer Testing         Fascial Decompression         Bowel Massage         PFM stretching   40' intravaginally & external OI External OI MET + shotgun  B/l leg pull MET L, leg pull PSIS fascia decompression 25'      Adductor fascial decompression with roller    Obturator internus trigger point release    X 3    15' TA activation x 10  Straight leg raise x 10    Supine marches x 10       Modalities                           Outcome Measure          PFDI - 102

## 2025-06-08 DIAGNOSIS — F41.9 ANXIETY AND DEPRESSION: ICD-10-CM

## 2025-06-08 DIAGNOSIS — N95.1 MENOPAUSAL SYMPTOMS: ICD-10-CM

## 2025-06-08 DIAGNOSIS — F32.A ANXIETY AND DEPRESSION: ICD-10-CM

## 2025-06-09 RX ORDER — ESTRADIOL 0.05 MG/D
1 PATCH, EXTENDED RELEASE TRANSDERMAL 2 TIMES WEEKLY
Qty: 24 PATCH | Refills: 0 | OUTPATIENT
Start: 2025-06-09

## 2025-06-09 RX ORDER — ACETAMINOPHEN AND CODEINE PHOSPHATE 120; 12 MG/5ML; MG/5ML
1 SOLUTION ORAL DAILY
Qty: 84 TABLET | Refills: 0 | OUTPATIENT
Start: 2025-06-09

## 2025-06-09 RX ORDER — HYDROXYZINE HYDROCHLORIDE 25 MG/1
25 TABLET, FILM COATED ORAL EVERY 6 HOURS PRN
Qty: 90 TABLET | Refills: 1 | Status: SHIPPED | OUTPATIENT
Start: 2025-06-09

## 2025-06-11 ENCOUNTER — OFFICE VISIT (OUTPATIENT)
Dept: PHYSICAL THERAPY | Facility: REHABILITATION | Age: 55
End: 2025-06-11
Attending: OBSTETRICS & GYNECOLOGY
Payer: COMMERCIAL

## 2025-06-11 DIAGNOSIS — N39.3 STRESS INCONTINENCE: Primary | ICD-10-CM

## 2025-06-11 DIAGNOSIS — M62.89 PELVIC FLOOR DYSFUNCTION: ICD-10-CM

## 2025-06-11 PROCEDURE — 97110 THERAPEUTIC EXERCISES: CPT

## 2025-06-11 PROCEDURE — 97140 MANUAL THERAPY 1/> REGIONS: CPT

## 2025-06-11 NOTE — PROGRESS NOTES
Daily Note     Today's date: 2025  Patient name: Anne Calderón  : 1970  MRN: [id number]  Referring provider: Emani Mckeon MD  Dx:   Encounter Diagnosis     ICD-10-CM    1. Stress incontinence  N39.3       2. Pelvic floor dysfunction  M62.89                        Subjective: Pt notes she went running (4k) and p! Resurfaces in R OI.       Objective: See treatment diary below    - Derotation bilateral     IR: R 4-/5 p! , L 4/5  ER: R 4/5 , L 5/5    PROM IR/ER: WFL bilaterally - no pain     Assessment: Tolerated treatment well. Performed additional hip screen w/ + indicators for IR strengthening/ endurance. Anticipate pain surfacing due to fatigue w/ repetitive nature of running. Incorporated muscle activation exercises notably for IR/ER w/ education for lower weight but higher rep. Educated pt to avoid prolonged end range stretching. Pt was able to video HEP.   Pt to leave for 2 month vacation. Educated to reach out for questions. Plan for re-evaluation upon return for any remaining deficits that would indicate continued skilled care.                    Plan: Re-evaluate [place]      Precautions: Standard   Patient Active Problem List   Diagnosis    Anxiety and depression    BRCA1 gene mutation positive    Primary insomnia    Menopausal symptoms    S/P mastectomy, bilateral    Knee arthropathy         Diagnosis:    POC expires (Date that your POC expires) Auth Status? (BOMN, approved, pending) Unit limit (Daily) Auth Start date Expiration date PT/OT + Visit Limit?   25 Pending         Date of Service 04/16/25 4/30 5/13 5/20 5/28 6/3 6/11   Visits Used 1 2 3 4 5 6 7   Visits Remaining          Medbridge Created                    Neuro Re-Ed          Urge deferral Educated w/ internal cues   8'      Reviewed        Defecation mechanics          Breathing Mechanics IC   100% accuracy   HEP   5'          PFM Coordination          PFM Down Training          Internal Cueing           Biofeedback           Reformer    1B1W straight legs, single adductor, tall kneel adductor, kenrick legs with arms, 90/90 w/ ball, wheelbarrow both ways but more challenged with reverse one 1B1W straight legs,     single adductor,     tall kneel adductor,     kenrick legs with arms,     90/90 w/ ball,     wheelbarrow both ways but more challenged with rev 1B1W straight legs,     single adductor,     tall kneel adductor,     kenrick legs with arms,     90/90 w/ ball,     wheelbarrow both ways but more challenged with rev    Prone press up    10x  10x     Prone heel squeeze    2x10  2x10    Ther Ex          PFM Strengthening          Hip strengthening          Functional Strengthening  Reviewed hip IR/ER in standing x 10    Resisted IR and ER with slight hip hinge Y TB x 10    HEP   Hip hinge 10x  Tall kneel hip hinge but not fully down to avoid crepitus Hip hinge 10x  Tall kneel hip hinge but not fully down to avoid crepitus Reverse CS x20     Standing CS x20     Standing hip flexion w/ shoulder press #10 isometric x20        Abdominal Strengthening          Aerobic  TM 5', 5' reassess after manual        Therapeutic Rest Breaks          Mobility           High Impact          UE Strengthening                     Ther Activity          Voiding Diaries          Review of sxs          Fluid Intake                    Manual Ther          PFM exam Performed          Ortho exam       MMT/ hip screen   15'    Dynamometer Testing          Fascial Decompression          Bowel Massage          PFM stretching   40' intravaginally & external OI External OI MET + shotgun  B/l leg pull MET L, leg pull PSIS fascia decompression 25'       Adductor fascial decompression with roller    Obturator internus trigger point release    X 3    15' TA activation x 10  Straight leg raise x 10    Supine marches x 10        Modalities                              Outcome Measure           PFDI - 102

## 2025-06-12 ENCOUNTER — OFFICE VISIT (OUTPATIENT)
Dept: URGENT CARE | Age: 55
End: 2025-06-12
Payer: COMMERCIAL

## 2025-06-12 VITALS
TEMPERATURE: 97.4 F | SYSTOLIC BLOOD PRESSURE: 104 MMHG | HEART RATE: 71 BPM | RESPIRATION RATE: 16 BRPM | DIASTOLIC BLOOD PRESSURE: 66 MMHG | OXYGEN SATURATION: 97 %

## 2025-06-12 DIAGNOSIS — S61.210A LACERATION OF RIGHT INDEX FINGER WITHOUT FOREIGN BODY WITHOUT DAMAGE TO NAIL, INITIAL ENCOUNTER: Primary | ICD-10-CM

## 2025-06-12 DIAGNOSIS — Z23 ENCOUNTER FOR IMMUNIZATION: ICD-10-CM

## 2025-06-12 PROCEDURE — G0382 LEV 3 HOSP TYPE B ED VISIT: HCPCS | Performed by: STUDENT IN AN ORGANIZED HEALTH CARE EDUCATION/TRAINING PROGRAM

## 2025-06-12 PROCEDURE — 12002 RPR S/N/AX/GEN/TRNK2.6-7.5CM: CPT | Performed by: STUDENT IN AN ORGANIZED HEALTH CARE EDUCATION/TRAINING PROGRAM

## 2025-06-12 PROCEDURE — 90715 TDAP VACCINE 7 YRS/> IM: CPT

## 2025-06-12 NOTE — PATIENT INSTRUCTIONS
Today we cleaned your lacerations to your index finger and placed sutures.  You received an updated tetanus vaccine.  Please keep your dressing on for the next 24 hours as long as it stays clean and dry, if it does become dirty, wet, saturated, please change to a new one.  Keep your hand elevated is much as possible to help reduce swelling, pain, and remote healing.  After 24 hours, remove the dressing and she should continue washing the area with soap and water daily.  Apply a clean and dry dressing when you are anywhere that it could become dirty.    Avoid prolonged exposure to moisture.  Your sutures will be ready to come out in 7 to 10 days.    If you notice any signs of infection such as redness, swelling, pain increasing, or pus draining, fevers or chills, please seek a prompt reevaluation.

## 2025-06-12 NOTE — PROGRESS NOTES
[facility]        NAME: Anne Calderón is a 55 y.o. female  : 1970    MRN: [id number]  DATE: 2025  TIME: 12:11 PM    Assessment and Plan   Laceration of right index finger without foreign body without damage to nail, initial encounter [S61.210A]  1. Laceration of right index finger without foreign body without damage to nail, initial encounter  Tdap Vaccine greater than or equal to 6yo      2. Encounter for immunization  Tdap Vaccine greater than or equal to 6yo            Patient Instructions     Today we cleaned your lacerations to your index finger and placed sutures.  You received an updated tetanus vaccine.  Please keep your dressing on for the next 24 hours as long as it stays clean and dry, if it does become dirty, wet, saturated, please change to a new one.  Keep your hand elevated is much as possible to help reduce swelling, pain, and remote healing.  After 24 hours, remove the dressing and she should continue washing the area with soap and water daily.  Apply a clean and dry dressing when you are anywhere that it could become dirty.    Avoid prolonged exposure to moisture.  Your sutures will be ready to come out in 7 to 10 days.    If you notice any signs of infection such as redness, swelling, pain increasing, or pus draining, fevers or chills, please seek a prompt reevaluation.        Chief Complaint     Chief Complaint   Patient presents with    Finger Laceration     Patient sliced her right hand pointer finger on the . TDAP unknown         History of Present Illness       Patient presents for evaluation of lacerations to her right index finger.  Just prior to arrival, she was working with [employer] and accidentally cut into her right index finger.  Unsure of her last tetanus vaccination.  She is about to leave on a flight to [place], in need of speedy repair.    Finger Laceration        Review of Systems   Review of Systems   All other systems reviewed and are  negative.        Current Medications     Current Medications[1]    Current Allergies     Allergies as of 06/12/2025    (No Known Allergies)            The following portions of the patient's history were reviewed and updated as appropriate: allergies, current medications, past family history, past medical history, past social history, past surgical history and problem list.     Past Medical History[2]    Past Surgical History[3]    Family History[4]      Medications have been verified.        Objective   /66   Pulse 71   Temp (!) 97.4 °F (36.3 °C)   Resp 16   LMP 11/01/2016   SpO2 97%   Patient's last menstrual period was 11/01/2016.       Physical Exam     Physical Exam  Vitals and nursing note reviewed.   Constitutional:       General: She is not in acute distress.     Appearance: She is not toxic-appearing.   HENT:      Head: Normocephalic and atraumatic.      Right Ear: External ear normal.      Left Ear: External ear normal.      Nose: Nose normal.      Mouth/Throat:      Mouth: Mucous membranes are moist.     Eyes:      Extraocular Movements: Extraocular movements intact.      Conjunctiva/sclera: Conjunctivae normal.       Musculoskeletal:         General: Swelling present.        Hands:       Comments: Lacerations as above 3 cm curvilinear radial index finger and 2 cm palmar index finger across PIP,   the laceration involving the the laceration involving the radial index finger is deeper, subcutaneous fat exposed,  No tendon involvement  She has full active ROM     Skin:     General: Skin is warm and dry.     Neurological:      Mental Status: She is alert.         Universal Protocol:  procedure performed by consultantConsent: Verbal consent obtained  Risks and benefits: risks, benefits and alternatives were discussed  Consent given by: patient  Patient understanding: patient states understanding of the procedure being performed  Patient identity confirmed: verbally with patient  Laceration  repair    Date/Time: 6/12/2025 11:30 AM    Performed by: Lalitha Smith DO  Authorized by: Lalitha Smith DO  Body area: upper extremity  Location details: right index finger  Laceration length: 5 cm  Foreign bodies: no foreign bodies  Tendon involvement: none  Nerve involvement: none  Vascular damage: no  Anesthesia: nerve block    Anesthesia:  Local Anesthetic: lidocaine 2% without epinephrine  Anesthetic total (ml): 4.    Wound Dehiscence:  Superficial Wound Dehiscence: simple closure      Procedure Details:  Preparation: Patient was prepped and draped in the usual sterile fashion.  Irrigation solution: saline  Irrigation method: jet lavage  Amount of cleaning: standard  Skin closure: 5-0 nylon  Number of sutures: 9  Approximation: close  Approximation difficulty: simple  Dressing: non-adhesive packing strip and antibiotic ointment (coban)  Patient tolerance: patient tolerated the procedure well with no immediate complications                           [1]   Current Outpatient Medications:     ALPRAZolam (XANAX) 0.5 mg tablet, Take 0.5 mg by mouth daily at bedtime as needed for anxiety, Disp: , Rfl:     estradiol (Vivelle-Dot) 0.05 MG/24HR, Place 1 patch on the skin 2 (two) times a week, Disp: 24 patch, Rfl: 4    fluconazole (DIFLUCAN) 150 mg tablet, Take 1 tablet (150 mg total) by mouth once a week for 26 doses, Disp: 26 tablet, Rfl: 0    FLUoxetine (PROzac) 20 mg capsule, TAKE 1 CAPSULE BY MOUTH EVERY DAY, Disp: 90 capsule, Rfl: 1    hydrOXYzine HCL (ATARAX) 25 mg tablet, Take 1 tablet (25 mg total) by mouth every 6 (six) hours as needed for itching, Disp: 90 tablet, Rfl: 1    norethindrone (Jencycla) 0.35 MG tablet, Take 1 tablet (0.35 mg total) by mouth daily, Disp: 84 tablet, Rfl: 4    methylPREDNISolone 4 MG tablet therapy pack, TAKE 6 TABLETS ON DAY 1 AS DIRECTED ON PACKAGE AND DECREASE BY 1 TAB EACH DAY FOR A TOTAL OF 6 DAYS, Disp: , Rfl:   [2]   Past Medical History:  Diagnosis Date    Anxiety      Arthritis     BRCA positive 2005    Brca1    Ingrown toenail 2025    Onychomycosis     Pneumonia     PONV (postoperative nausea and vomiting)     Postoperative wound infection 2024    I have noticed redness which became worse    Visual impairment    [3]   Past Surgical History:  Procedure Laterality Date    APPENDECTOMY      BILATERAL OOPHORECTOMY      prophylactic    BREAST IMPLANT  2012    implant exchange    BREAST RECONSTRUCTION Bilateral 2006    in [place]    COLONOSCOPY      COLONOSCOPY      HYSTERECTOMY  2016    Ovarectomy preventive to cancer due to BRCA1    KNEE SURGERY Left 2012    meniscus/ACL    MASTECTOMY Bilateral 2006    prophylactic - in [place]    WI REMOVAL INTACT BREAST IMPLANT Left 2024    Procedure: EXPLORATION LEFT BREAST, LEFT BREAST IMPLANT REMOVAL AND CAPSULECTOMY;  Surgeon: Kemal Watt MD;  Location: [facility];  Service: Plastics    REVISION RECONSTRUCTED BREAST Left 2024    in [place]   [4]   Family History  Problem Relation Name Age of Onset    Breast cancer Mother Jo Ann Rivera              Cancer Father Reilly Calderón         Lung      Arthritis Sister Lulú Suazoedeli     Breast cancer Sister Lulú Suazomini         Positive BRCA1  and alive    Hyperlipidemia Sister Lulú Gelmini     Osteoarthritis Sister Lulú Gelmini     Arthritis Brother Magdiel Gelmini     Hyperlipidemia Brother Magdiel Gelmini     Osteoarthritis Brother Magdiel Gelmini

## 2025-08-01 ENCOUNTER — TELEPHONE (OUTPATIENT)
Dept: PHYSICAL THERAPY | Facility: REHABILITATION | Age: 55
End: 2025-08-01

## 2025-08-11 ENCOUNTER — OFFICE VISIT (OUTPATIENT)
Dept: PHYSICAL THERAPY | Facility: REHABILITATION | Age: 55
End: 2025-08-11
Attending: OBSTETRICS & GYNECOLOGY
Payer: COMMERCIAL

## 2025-08-18 ENCOUNTER — EVALUATION (OUTPATIENT)
Dept: PHYSICAL THERAPY | Facility: REHABILITATION | Age: 55
End: 2025-08-18
Attending: OBSTETRICS & GYNECOLOGY
Payer: COMMERCIAL

## 2025-08-18 DIAGNOSIS — M62.89 PELVIC FLOOR DYSFUNCTION IN FEMALE: ICD-10-CM

## 2025-08-18 DIAGNOSIS — N39.3 SUI (STRESS URINARY INCONTINENCE, FEMALE): Primary | ICD-10-CM

## 2025-08-18 DIAGNOSIS — N39.41 URGENCY INCONTINENCE: ICD-10-CM

## 2025-08-18 PROCEDURE — 97140 MANUAL THERAPY 1/> REGIONS: CPT

## 2025-08-22 ENCOUNTER — OFFICE VISIT (OUTPATIENT)
Dept: PLASTIC SURGERY | Facility: CLINIC | Age: 55
End: 2025-08-22
Payer: COMMERCIAL

## 2025-08-22 DIAGNOSIS — Z42.1 AFTERCARE POSTMASTECTOMY FOR BREAST RECONSTRUCTION: Primary | ICD-10-CM

## 2025-08-22 PROCEDURE — 99215 OFFICE O/P EST HI 40 MIN: CPT | Performed by: SURGERY

## (undated) DEVICE — SUT SILK 5-0 P-3 18 IN 640G

## (undated) DEVICE — JP CHAN DRN SIL HUBLESS 15FR W/TRO: Brand: CARDINAL HEALTH

## (undated) DEVICE — EXOFIN PRECISION PEN HIGH VISCOSITY TOPICAL SKIN ADHESIVE: Brand: EXOFIN PRECISION PEN, 1G

## (undated) DEVICE — GLOVE SRG BIOGEL 7

## (undated) DEVICE — ELECTRODE BLADE MOD E-Z CLEAN 4IN -0014AM

## (undated) DEVICE — SKIN MARKER DUAL TIP WITH RULER CAP, FLEXIBLE RULER AND LABELS: Brand: DEVON

## (undated) DEVICE — ELECTRODE BLADE MOD E-Z CLEAN  2.75IN 7CM -0012AM

## (undated) DEVICE — MEDI-VAC YANKAUER SUCTION HANDLE W/STRAIGHT TIP & CONTROL VENT: Brand: CARDINAL HEALTH

## (undated) DEVICE — 3M™ DURAPORE™ SURGICAL TAPE 2 INCHES X 10YARDS (5.0CM X 9.1M) 6ROLLS/CARTON 10CARTONS/CASE 1538-2: Brand: 3M™ DURAPORE™

## (undated) DEVICE — BETHLEHEM UNIVERSAL MINOR GEN: Brand: CARDINAL HEALTH

## (undated) DEVICE — DISPOSABLE OR TOWEL: Brand: CARDINAL HEALTH

## (undated) DEVICE — BRA SURGICAL SZ SMALL (30-33)

## (undated) DEVICE — OCCLUSIVE GAUZE STRIP,3% BISMUTH TRIBROMOPHENATE IN PETROLATUM BLEND: Brand: XEROFORM

## (undated) DEVICE — INTENDED FOR TISSUE SEPARATION, AND OTHER PROCEDURES THAT REQUIRE A SHARP SURGICAL BLADE TO PUNCTURE OR CUT.: Brand: BARD-PARKER ® CARBON RIB-BACK BLADES

## (undated) DEVICE — SUT STRATAFIX SPIRAL MONOCRYL PLUS 4-0 PS-2 45CM SXMP1B118

## (undated) DEVICE — ANTIBACTERIAL UNDYED BRAIDED (POLYGLACTIN 910), SYNTHETIC ABSORBABLE SUTURE: Brand: COATED VICRYL

## (undated) DEVICE — TUBING SUCTION 5MM X 12 FT

## (undated) DEVICE — JACKSON-PRATT 100CC BULB RESERVOIR: Brand: CARDINAL HEALTH

## (undated) DEVICE — GAUZE SPONGES,16 PLY: Brand: CURITY

## (undated) DEVICE — NEPTUNE E-SEP SMOKE EVACUATION PENCIL, COATED, 70MM BLADE, PUSH BUTTON SWITCH: Brand: NEPTUNE E-SEP

## (undated) DEVICE — PROXIMATE SKIN STAPLERS (35 WIDE) CONTAINS 35 STAINLESS STEEL STAPLES (FIXED HEAD): Brand: PROXIMATE

## (undated) DEVICE — PAD GROUNDING DUAL ADULT

## (undated) DEVICE — SUT VICRYL PLUS 2-0 CTB-1 27 IN VCPB259H

## (undated) DEVICE — DECANTER: Brand: UNBRANDED

## (undated) DEVICE — DRESSING MEPORE FILM ADHESIVE 4 X 5IN

## (undated) DEVICE — CHLORAPREP HI-LITE 26ML ORANGE

## (undated) DEVICE — NEEDLE 25G X 1 1/2

## (undated) DEVICE — 3M™ STERI-STRIP™ COMPOUND BENZOIN TINCTURE 40 BAGS/CARTON 4 CARTONS/CASE C1544: Brand: 3M™ STERI-STRIP™

## (undated) DEVICE — TIBURON TRANSVERSE LAPAROTOMY SHEET: Brand: CONVERTORS

## (undated) DEVICE — SUT ETHILON 3-0 PS-1 18 IN 1663H

## (undated) DEVICE — SUT PDS II 3-0 SH 27 IN Z316H

## (undated) DEVICE — FUNNEL E KELLER 2 DELIVERY DEVICE